# Patient Record
Sex: FEMALE | Race: WHITE | NOT HISPANIC OR LATINO | Employment: UNEMPLOYED | URBAN - METROPOLITAN AREA
[De-identification: names, ages, dates, MRNs, and addresses within clinical notes are randomized per-mention and may not be internally consistent; named-entity substitution may affect disease eponyms.]

---

## 2017-08-21 ENCOUNTER — PROCEDURE VISIT (OUTPATIENT)
Dept: OBSTETRICS AND GYNECOLOGY | Facility: CLINIC | Age: 22
End: 2017-08-21

## 2017-08-21 ENCOUNTER — INITIAL PRENATAL (OUTPATIENT)
Dept: OBSTETRICS AND GYNECOLOGY | Facility: CLINIC | Age: 22
End: 2017-08-21

## 2017-08-21 VITALS
BODY MASS INDEX: 20.55 KG/M2 | HEIGHT: 63 IN | DIASTOLIC BLOOD PRESSURE: 81 MMHG | SYSTOLIC BLOOD PRESSURE: 125 MMHG | WEIGHT: 116 LBS

## 2017-08-21 DIAGNOSIS — Z34.02 ENCOUNTER FOR SUPERVISION OF NORMAL FIRST PREGNANCY IN SECOND TRIMESTER: Primary | ICD-10-CM

## 2017-08-21 DIAGNOSIS — Z36.89 ENCOUNTER TO ESTABLISH GESTATIONAL AGE USING ULTRASOUND: Primary | ICD-10-CM

## 2017-08-21 PROCEDURE — 99203 OFFICE O/P NEW LOW 30 MIN: CPT | Performed by: OBSTETRICS & GYNECOLOGY

## 2017-08-21 PROCEDURE — 76817 TRANSVAGINAL US OBSTETRIC: CPT | Performed by: OBSTETRICS & GYNECOLOGY

## 2017-08-21 NOTE — PROGRESS NOTES
CC:  Initial OB visit  Patient presents for initial OB visit.  History reviewed with her.  Patient is overall in good health.  She denies any problems since she found out she was pregnant.  She is currently taking a prenatal vitamin.  Initial OB counseling was done with patient.  Discussed CF screening with patient.  A/P:  Supervision of early pregnancy  --Dating u/s ordered  --Labwork today  --F/U in 4 weeks  Counseling was given to patient for the following topics: instructions for management, risk factor reductions, patient and family education, risks and benefits of treatment options and importance of treatment compliance . Total time of the encounter was 30 minutes and 20 minutes was spend counseling.

## 2017-08-22 LAB
ABO GROUP BLD: (no result)
BASOPHILS # BLD AUTO: 0 X10E3/UL (ref 0–0.2)
BASOPHILS NFR BLD AUTO: 0 %
BLD GP AB SCN SERPL QL: NEGATIVE
EOSINOPHIL # BLD AUTO: 0 X10E3/UL (ref 0–0.4)
EOSINOPHIL NFR BLD AUTO: 0 %
ERYTHROCYTE [DISTWIDTH] IN BLOOD BY AUTOMATED COUNT: 12.6 % (ref 12.3–15.4)
HBV SURFACE AG SERPL QL IA: NEGATIVE
HCT VFR BLD AUTO: 41.8 % (ref 34–46.6)
HGB BLD-MCNC: 13.8 G/DL (ref 11.1–15.9)
HIV 1+2 AB+HIV1 P24 AG SERPL QL IA: NON REACTIVE
IMM GRANULOCYTES # BLD: 0 X10E3/UL (ref 0–0.1)
IMM GRANULOCYTES NFR BLD: 0 %
LYMPHOCYTES # BLD AUTO: 2 X10E3/UL (ref 0.7–3.1)
LYMPHOCYTES NFR BLD AUTO: 17 %
MCH RBC QN AUTO: 30.1 PG (ref 26.6–33)
MCHC RBC AUTO-ENTMCNC: 33 G/DL (ref 31.5–35.7)
MCV RBC AUTO: 91 FL (ref 79–97)
MONOCYTES # BLD AUTO: 0.6 X10E3/UL (ref 0.1–0.9)
MONOCYTES NFR BLD AUTO: 5 %
NEUTROPHILS # BLD AUTO: 8.7 X10E3/UL (ref 1.4–7)
NEUTROPHILS NFR BLD AUTO: 78 %
PLATELET # BLD AUTO: 203 X10E3/UL (ref 150–379)
RBC # BLD AUTO: 4.58 X10E6/UL (ref 3.77–5.28)
RH BLD: POSITIVE
RPR SER QL: NON REACTIVE
RUBV IGG SERPL IA-ACNC: 6.15 INDEX
WBC # BLD AUTO: 11.4 X10E3/UL (ref 3.4–10.8)

## 2017-08-23 LAB
BACTERIA UR CULT: NO GROWTH
BACTERIA UR CULT: NORMAL
C TRACH RRNA SPEC QL NAA+PROBE: NEGATIVE
N GONORRHOEA RRNA SPEC QL NAA+PROBE: NEGATIVE
T VAGINALIS RRNA SPEC QL NAA+PROBE: NEGATIVE

## 2017-08-25 LAB
CFTR MUT ANL BLD/T: NORMAL
CONV COMMENT: NORMAL

## 2017-09-18 ENCOUNTER — ROUTINE PRENATAL (OUTPATIENT)
Dept: OBSTETRICS AND GYNECOLOGY | Facility: CLINIC | Age: 22
End: 2017-09-18

## 2017-09-18 VITALS — BODY MASS INDEX: 20.37 KG/M2 | DIASTOLIC BLOOD PRESSURE: 78 MMHG | SYSTOLIC BLOOD PRESSURE: 106 MMHG | WEIGHT: 115 LBS

## 2017-09-18 DIAGNOSIS — Z34.01 ENCOUNTER FOR SUPERVISION OF NORMAL FIRST PREGNANCY IN FIRST TRIMESTER: Primary | ICD-10-CM

## 2017-09-18 PROCEDURE — 99213 OFFICE O/P EST LOW 20 MIN: CPT | Performed by: OBSTETRICS & GYNECOLOGY

## 2017-09-18 NOTE — PROGRESS NOTES
CC: Pregnancy  Patient complains of some nausea.  Patient states that she is tolerating most meals.  She declines any medication.  Discussed conservative management options for nausea and pregnancy.  Reviewed prenatal labs and ultrasound with her.  Discussed change in RANGEL.  Patient states that she has wanting gender testing.  Discussed cell free DNA testing with her and that it is not recommended in low risk pregnancies.  Discussed the rate of false positives and limitations of the test.  Also explained to her that her insurance will not cover it.  She still desires testing.  A/P:  Supervision of normal first pregnancy at 12 weeks  --F/U in 4 weeks  --Informaseq testing today  Counseling was given to patient for the following topics: diagnostic results, instructions for management, patient and family education and risks and benefits of treatment options . Total time of the encounter was 15 minutes and 10 minutes was spend counseling.

## 2017-09-22 LAB
# FETUSES US: 1
CFDNA.FET/CFDNA.TOTAL SFR FETUS: 17.2 %
CHR X + Y ANEUP PLAS.CFDNA: NORMAL
CITATION REF LAB TEST: NORMAL
CYTOGENETICS STUDY: NORMAL
FET 13+18+21+X+Y ANEUP PLAS.CFDNA: NORMAL
FET CHR 13 TS PLAS.CFDNA QL: NORMAL
FET CHR 13 TS PLAS.CFDNA QL: NORMAL
FET CHR 18 TS PLAS.CFDNA QL: NORMAL
FET CHR 18 TS PLAS.CFDNA QL: NORMAL
FET CHR 21 TS PLAS.CFDNA QL: NORMAL
FET CHR 21 TS PLAS.CFDNA QL: NORMAL
FET CHROM X + Y ANEUP CFDNA IMP: NORMAL
GA: 12 WEEKS
GENETIC ALGORITHM SENSITIVITY: NORMAL %
LAB DIRECTOR NAME PROVIDER: NORMAL
Lab: NORMAL
REASON FOR REFERRAL (NARRATIVE): NORMAL
REF LAB TEST METHOD: NORMAL
SERVICE CMNT 02-IMP: NORMAL
SERVICE CMNT-IMP: NORMAL

## 2017-09-25 ENCOUNTER — TELEPHONE (OUTPATIENT)
Dept: OBSTETRICS AND GYNECOLOGY | Facility: CLINIC | Age: 22
End: 2017-09-25

## 2017-09-25 NOTE — TELEPHONE ENCOUNTER
----- Message from Franchesca Tanner MD sent at 9/25/2017  8:52 AM EDT -----  Please let patient know her genetic testing was normal and if she wants to know gender, it showed a male fetus.

## 2017-10-23 ENCOUNTER — ROUTINE PRENATAL (OUTPATIENT)
Dept: OBSTETRICS AND GYNECOLOGY | Facility: CLINIC | Age: 22
End: 2017-10-23

## 2017-10-23 VITALS — WEIGHT: 117 LBS | DIASTOLIC BLOOD PRESSURE: 82 MMHG | SYSTOLIC BLOOD PRESSURE: 120 MMHG | BODY MASS INDEX: 20.73 KG/M2

## 2017-10-23 DIAGNOSIS — Z34.02 ENCOUNTER FOR SUPERVISION OF NORMAL FIRST PREGNANCY IN SECOND TRIMESTER: Primary | ICD-10-CM

## 2017-10-23 PROCEDURE — 99213 OFFICE O/P EST LOW 20 MIN: CPT | Performed by: OBSTETRICS & GYNECOLOGY

## 2017-10-23 NOTE — PROGRESS NOTES
CC:  Pregnancy  Pt without c/o's.  Declines flu vaccine.  Patient has no complaints today.  She states her nausea is improving.  Discussed MSAFP testing with her and she desires.  Also discussed anatomy ultrasound in 3 weeks.  A/P:  Supervision of normal first pregnancy at 17 weeks  --msafp testing today  --anatomy u/s in 3 weeks  --ob visit in 4 weeks  Counseling was given to patient for the following topics: diagnostic results, instructions for management and patient and family education . Total time of the encounter was 15 minutes and 10 minutes was spend counseling.

## 2017-10-25 ENCOUNTER — TELEPHONE (OUTPATIENT)
Dept: OBSTETRICS AND GYNECOLOGY | Facility: CLINIC | Age: 22
End: 2017-10-25

## 2017-10-25 LAB
2ND TRIMESTER 4 SCREEN SERPL-IMP: NORMAL
2ND TRIMESTER 4 SCREEN SERPL-IMP: NORMAL
AFP ADJ MOM SERPL: 1.63
AFP SERPL-MCNC: 72.8 NG/ML
AGE AT DELIVERY: 21.2 YEARS
FET TS 18 RISK FROM MAT AGE: NORMAL
FET TS 21 RISK FROM MAT AGE: 1144
GA METHOD: NORMAL
GA: 17 WEEKS
HCG ADJ MOM SERPL: 1.94
HCG SERPL-ACNC: NORMAL MIU/ML
IDDM PATIENT QL: NO
INHIBIN A ADJ MOM SERPL: 1.13
INHIBIN A SERPL-MCNC: 231.87 PG/ML
LABORATORY COMMENT REPORT: NORMAL
MULTIPLE PREGNANCY: NO
NEURAL TUBE DEFECT RISK FETUS: 1944 %
RESULT: NORMAL
TS 18 RISK FETUS: NORMAL
TS 21 RISK FETUS: NORMAL
U ESTRIOL ADJ MOM SERPL: 0.81
U ESTRIOL SERPL-MCNC: 0.94 NG/ML

## 2017-10-25 NOTE — TELEPHONE ENCOUNTER
----- Message from Colette Alvarenga sent at 10/25/2017  9:48 AM EDT -----  Patient aware of her test results.

## 2017-11-13 ENCOUNTER — PROCEDURE VISIT (OUTPATIENT)
Dept: OBSTETRICS AND GYNECOLOGY | Facility: CLINIC | Age: 22
End: 2017-11-13

## 2017-11-13 DIAGNOSIS — Z36.3 ANTENATAL SCREENING FOR MALFORMATION USING ULTRASONICS: Primary | ICD-10-CM

## 2017-11-13 PROCEDURE — 76805 OB US >/= 14 WKS SNGL FETUS: CPT | Performed by: OBSTETRICS & GYNECOLOGY

## 2017-11-20 ENCOUNTER — ROUTINE PRENATAL (OUTPATIENT)
Dept: OBSTETRICS AND GYNECOLOGY | Facility: CLINIC | Age: 22
End: 2017-11-20

## 2017-11-20 VITALS — DIASTOLIC BLOOD PRESSURE: 67 MMHG | BODY MASS INDEX: 21.68 KG/M2 | SYSTOLIC BLOOD PRESSURE: 116 MMHG | WEIGHT: 122.4 LBS

## 2017-11-20 DIAGNOSIS — Z34.02 ENCOUNTER FOR SUPERVISION OF NORMAL FIRST PREGNANCY IN SECOND TRIMESTER: Primary | ICD-10-CM

## 2017-11-20 PROCEDURE — 99213 OFFICE O/P EST LOW 20 MIN: CPT | Performed by: OBSTETRICS & GYNECOLOGY

## 2017-11-20 RX ORDER — ACETAMINOPHEN 325 MG/1
650 TABLET ORAL EVERY 6 HOURS PRN
COMMUNITY
End: 2018-02-12

## 2017-11-20 NOTE — PROGRESS NOTES
CC:  Pregnancy  Patient complains of occasional pain in her stomach that radiates into her chest.  States it occurs after meals.  Pain is likely related to gas or heartburn.  Discussed OTC medications she may take.  Anatomy u/s reviewed with her.  Discussed 1 hr gtt at her next visit and instructions given for test.  A/P:  Supervision of normal first pregnancy at 21 weeks  --F/U in 4 weeks  --1 hr gtt at next visit  Counseling was given to patient for the following topics: diagnostic results, instructions for management and patient and family education . Total time of the encounter was 15 minutes and 10 minutes was spend counseling.

## 2017-12-19 ENCOUNTER — ROUTINE PRENATAL (OUTPATIENT)
Dept: OBSTETRICS AND GYNECOLOGY | Facility: CLINIC | Age: 22
End: 2017-12-19

## 2017-12-19 VITALS — WEIGHT: 130.8 LBS | BODY MASS INDEX: 23.17 KG/M2 | SYSTOLIC BLOOD PRESSURE: 102 MMHG | DIASTOLIC BLOOD PRESSURE: 67 MMHG

## 2017-12-19 DIAGNOSIS — Z34.02 ENCOUNTER FOR SUPERVISION OF NORMAL FIRST PREGNANCY IN SECOND TRIMESTER: ICD-10-CM

## 2017-12-19 DIAGNOSIS — O99.891 BACK PAIN AFFECTING PREGNANCY IN SECOND TRIMESTER: ICD-10-CM

## 2017-12-19 DIAGNOSIS — Z3A.25 25 WEEKS GESTATION OF PREGNANCY: Primary | ICD-10-CM

## 2017-12-19 DIAGNOSIS — M54.9 BACK PAIN AFFECTING PREGNANCY IN SECOND TRIMESTER: ICD-10-CM

## 2017-12-19 PROCEDURE — 99213 OFFICE O/P EST LOW 20 MIN: CPT | Performed by: OBSTETRICS & GYNECOLOGY

## 2017-12-19 NOTE — PROGRESS NOTES
CC: Pregnancy follow up  Pt states that she has been having pain in lower abdomen for the past 2 days. Not sure if Preston dominguez.  Patient describes pain as mild.  No bleeding or spotting.  Excellent FM.  Patient has back discomfort, but has history of scoliosis.  Vitals reviewed by me.  Gen - alert and pleasant.  Abdomen - gravid, nontender, no guarding or rebound.  Glucola in progress.  A/P:  IUP at 25 weeks  - Flu vaccine discussed.  Discussed rationale and benefits.  Patient to consider.  - Back pain.  Discussed symptomatic treatments.  Scoliosis alone is not a contraindication to regional anesthesia, need for .  - maternal well being discussed.    I spent 15 minutes with patient and 10 minutes in counseling of above issues.

## 2017-12-20 LAB — GLUCOSE 1H P 50 G GLC PO SERPL-MCNC: 54 MG/DL (ref 65–139)

## 2018-01-15 ENCOUNTER — ROUTINE PRENATAL (OUTPATIENT)
Dept: OBSTETRICS AND GYNECOLOGY | Facility: CLINIC | Age: 23
End: 2018-01-15

## 2018-01-15 VITALS — SYSTOLIC BLOOD PRESSURE: 106 MMHG | BODY MASS INDEX: 24.2 KG/M2 | DIASTOLIC BLOOD PRESSURE: 64 MMHG | WEIGHT: 136.6 LBS

## 2018-01-15 DIAGNOSIS — Z34.03 ENCOUNTER FOR SUPERVISION OF NORMAL FIRST PREGNANCY IN THIRD TRIMESTER: Primary | ICD-10-CM

## 2018-01-15 PROCEDURE — 99213 OFFICE O/P EST LOW 20 MIN: CPT | Performed by: OBSTETRICS & GYNECOLOGY

## 2018-01-15 PROCEDURE — 90656 IIV3 VACC NO PRSV 0.5 ML IM: CPT | Performed by: OBSTETRICS & GYNECOLOGY

## 2018-01-15 PROCEDURE — 90715 TDAP VACCINE 7 YRS/> IM: CPT | Performed by: OBSTETRICS & GYNECOLOGY

## 2018-01-15 PROCEDURE — 90472 IMMUNIZATION ADMIN EACH ADD: CPT | Performed by: OBSTETRICS & GYNECOLOGY

## 2018-01-15 PROCEDURE — 90471 IMMUNIZATION ADMIN: CPT | Performed by: OBSTETRICS & GYNECOLOGY

## 2018-01-15 NOTE — PROGRESS NOTES
CC:  Pregnancy  Patient complains of some pelvic pressure with walking.  She also reports some soreness at the very top of her abdomen.  She denies any chest pain or shortness of breath.  On exam, her lungs are clear to auscultation bilaterally and heart is regular rate and rhythm.  Pain seems most consistent with musculoskeletal pain from the baby growing and the skin stretching.  Patient reports good fetal movement.  Discussed recommendations for Tdap and flu vaccine and she desires both vaccinations.  A/P:  Supervision of normal pregnancy at 29 weeks  --Will check growth u/s at next visit  --tdap and flu shot today  --f/u in 2 weeks  Counseling was given to patient for the following topics: diagnostic results, instructions for management and patient and family education . Total time of the encounter was 15 minutes and 10 minutes was spend counseling.

## 2018-01-29 ENCOUNTER — ROUTINE PRENATAL (OUTPATIENT)
Dept: OBSTETRICS AND GYNECOLOGY | Facility: CLINIC | Age: 23
End: 2018-01-29

## 2018-01-29 ENCOUNTER — PROCEDURE VISIT (OUTPATIENT)
Dept: OBSTETRICS AND GYNECOLOGY | Facility: CLINIC | Age: 23
End: 2018-01-29

## 2018-01-29 VITALS — DIASTOLIC BLOOD PRESSURE: 77 MMHG | BODY MASS INDEX: 24.69 KG/M2 | WEIGHT: 139.4 LBS | SYSTOLIC BLOOD PRESSURE: 115 MMHG

## 2018-01-29 DIAGNOSIS — Z3A.31 31 WEEKS GESTATION OF PREGNANCY: ICD-10-CM

## 2018-01-29 DIAGNOSIS — O26.843 UTERINE SIZE DATE DISCREPANCY PREGNANCY, THIRD TRIMESTER: Primary | ICD-10-CM

## 2018-01-29 PROCEDURE — 99213 OFFICE O/P EST LOW 20 MIN: CPT | Performed by: OBSTETRICS & GYNECOLOGY

## 2018-01-29 PROCEDURE — 76816 OB US FOLLOW-UP PER FETUS: CPT | Performed by: OBSTETRICS & GYNECOLOGY

## 2018-01-29 NOTE — PROGRESS NOTES
CC:  Pregnancy  Pt feels well. No issues or concerns.  Reviewed growth ultrasound with patient and growth is reassuring.  Fetus is noted to be in the adolph breech presentation.  Discussed with patient that the baby can still flip to vertex presentation and will reevaluate around 35-36 weeks.  Discussed with her and her significant other management options of a breech fetus near-term including external cephalic version.  Explained to them that if the baby is breech at time of labor, she will need a primary .  They verbalize understanding.  She reports good fetal movement.  She denies any contractions or leaking of fluid.  A/P:  Supervision of normal pregnancy at 31 weeks with breech fetus  --F/U in 2 weeks  Counseling was given to patient for the following topics: diagnostic results, instructions for management, prognosis and patient and family education . Total time of the encounter was 15 minutes and 10 minutes was spend counseling.

## 2018-02-12 ENCOUNTER — ROUTINE PRENATAL (OUTPATIENT)
Dept: OBSTETRICS AND GYNECOLOGY | Facility: CLINIC | Age: 23
End: 2018-02-12

## 2018-02-12 VITALS — SYSTOLIC BLOOD PRESSURE: 103 MMHG | DIASTOLIC BLOOD PRESSURE: 68 MMHG | BODY MASS INDEX: 25.26 KG/M2 | WEIGHT: 142.6 LBS

## 2018-02-12 DIAGNOSIS — Z3A.33 33 WEEKS GESTATION OF PREGNANCY: ICD-10-CM

## 2018-02-12 PROCEDURE — 99213 OFFICE O/P EST LOW 20 MIN: CPT | Performed by: OBSTETRICS & GYNECOLOGY

## 2018-02-12 NOTE — PROGRESS NOTES
CC:  Pregnancy  Pt feels well. No issues or concerns.  Patient reports good fetal movement.  Baby is still in breech presentation on Leopold's.  Discussed with patient and ECV at 37 weeks the baby remains breech.  Will recheck an ultrasound for position 36 weeks to confirm.  Discussed a primary  between 39 and 40 weeks if the baby remains breech until her due date.  Discussed fetal kick counts.  A/P:  Supervision of normal pregnancy at 33 weeks with breech presentation of fetus  --F/U in 2 weeks  Counseling was given to patient for the following topics: instructions for management and patient and family education . Total time of the encounter was 15 minutes and 10 minutes was spend counseling.

## 2018-02-26 ENCOUNTER — ROUTINE PRENATAL (OUTPATIENT)
Dept: OBSTETRICS AND GYNECOLOGY | Facility: CLINIC | Age: 23
End: 2018-02-26

## 2018-02-26 VITALS — SYSTOLIC BLOOD PRESSURE: 102 MMHG | BODY MASS INDEX: 25.54 KG/M2 | DIASTOLIC BLOOD PRESSURE: 65 MMHG | WEIGHT: 144.2 LBS

## 2018-02-26 DIAGNOSIS — Z3A.35 35 WEEKS GESTATION OF PREGNANCY: ICD-10-CM

## 2018-02-26 PROCEDURE — 99213 OFFICE O/P EST LOW 20 MIN: CPT | Performed by: OBSTETRICS & GYNECOLOGY

## 2018-02-26 NOTE — PROGRESS NOTES
CC:  Pregnancy  Pt feels well. No issues or concerns.  Patient reports good fetal movement.  Fetus remains breech on Leopold's.  Will check an ultrasound with her visit next week to confirm position.  Discussed external cephalic version versus primary  with patient and discussed risks involved with external cephalic version.  Patient and her significant other have discussed this previous to today's visit and patient states that she does not want an external cephalic version.  She would prefer to have a primary  section if fetus remains breech.  GBS swab was collected today.  A/P:  Supervision of pregnancy at 35 weeks with fetus in breech presentation  --GBS done today  --F/U next week with u/s for position  Counseling was given to patient for the following topics: instructions for management, patient and family education and risks and benefits of treatment options . Total time of the encounter was 15 minutes and 10 minutes was spend counseling.

## 2018-03-02 LAB — B-HEM STREP SPEC QL CULT: NEGATIVE

## 2018-03-06 ENCOUNTER — PROCEDURE VISIT (OUTPATIENT)
Dept: OBSTETRICS AND GYNECOLOGY | Facility: CLINIC | Age: 23
End: 2018-03-06

## 2018-03-06 ENCOUNTER — ROUTINE PRENATAL (OUTPATIENT)
Dept: OBSTETRICS AND GYNECOLOGY | Facility: CLINIC | Age: 23
End: 2018-03-06

## 2018-03-06 VITALS — SYSTOLIC BLOOD PRESSURE: 106 MMHG | BODY MASS INDEX: 26.29 KG/M2 | DIASTOLIC BLOOD PRESSURE: 70 MMHG | WEIGHT: 148.4 LBS

## 2018-03-06 DIAGNOSIS — Z3A.36 36 WEEKS GESTATION OF PREGNANCY: ICD-10-CM

## 2018-03-06 PROCEDURE — 99213 OFFICE O/P EST LOW 20 MIN: CPT | Performed by: OBSTETRICS & GYNECOLOGY

## 2018-03-06 PROCEDURE — 76815 OB US LIMITED FETUS(S): CPT | Performed by: OBSTETRICS & GYNECOLOGY

## 2018-03-06 NOTE — PROGRESS NOTES
CC:  Pregnancy  Patient had an ultrasound to confirm position today and the infant remains in the adolph breech presentation.  Discussed option of external cephalic version with patient and her significant other but they declined.  Patient states that they prefer to have a primary  section.  Will schedule this between 39 and 40 weeks.   section was discussed with them.  Patient complains of some sharp lower abdominal pains and intermittent cramping.  Normal musculoskeletal complaints were discussed with her.  Labor precautions were reviewed and discussed fetal kick counts.  A/P:  Supervision of normal pregnancy at 36 weeks with breech presentation of fetus  --Will schedule primary c section  --F/U weekly  Counseling was given to patient for the following topics: diagnostic results, instructions for management, patient and family education and risks and benefits of treatment options . Total time of the encounter was 15 minutes and 10 minutes was spend counseling.

## 2018-03-12 ENCOUNTER — ROUTINE PRENATAL (OUTPATIENT)
Dept: OBSTETRICS AND GYNECOLOGY | Facility: CLINIC | Age: 23
End: 2018-03-12

## 2018-03-12 VITALS — BODY MASS INDEX: 26.54 KG/M2 | SYSTOLIC BLOOD PRESSURE: 116 MMHG | DIASTOLIC BLOOD PRESSURE: 71 MMHG | WEIGHT: 149.8 LBS

## 2018-03-12 DIAGNOSIS — Z3A.37 37 WEEKS GESTATION OF PREGNANCY: ICD-10-CM

## 2018-03-12 PROCEDURE — 99213 OFFICE O/P EST LOW 20 MIN: CPT | Performed by: OBSTETRICS & GYNECOLOGY

## 2018-03-12 NOTE — PROGRESS NOTES
CC:  Pregnancy  Pt feels well. No issues or concerns.  Patient's  has been scheduled.  Date and time reviewed with her and discussed preoperative instructions.  Patient is attending a  class at the hospital.  All questions were answered today regarding .  She reports good fetal movement.  Labor precautions reviewed and patient instructed to go to L&D with onset of labor or rupture of membranes.  She denies any contractions or leaking.  A/P:  Supervision of normal pregnancy at 37 weeks with breech fetus  --F/U weekly  Counseling was given to patient for the following topics: instructions for management and patient and family education . Total time of the encounter was 15 minutes and 10 minutes was spend counseling.

## 2018-03-14 ENCOUNTER — HOSPITAL ENCOUNTER (OUTPATIENT)
Facility: HOSPITAL | Age: 23
Setting detail: OBSERVATION
Discharge: HOME | End: 2018-03-14
Attending: OBSTETRICS & GYNECOLOGY | Admitting: OBSTETRICS & GYNECOLOGY

## 2018-03-14 VITALS
RESPIRATION RATE: 16 BRPM | BODY MASS INDEX: 26.68 KG/M2 | WEIGHT: 150.6 LBS | HEIGHT: 63 IN | DIASTOLIC BLOOD PRESSURE: 73 MMHG | HEART RATE: 84 BPM | TEMPERATURE: 98.2 F | SYSTOLIC BLOOD PRESSURE: 132 MMHG

## 2018-03-14 PROBLEM — Z34.90 PREGNANCY: Status: ACTIVE | Noted: 2018-03-14

## 2018-03-14 LAB — A1 MICROGLOB PLACENTAL VAG QL: NEGATIVE

## 2018-03-14 PROCEDURE — G0378 HOSPITAL OBSERVATION PER HR: HCPCS

## 2018-03-14 PROCEDURE — 84112 EVAL AMNIOTIC FLUID PROTEIN: CPT | Performed by: OBSTETRICS & GYNECOLOGY

## 2018-03-14 PROCEDURE — 59025 FETAL NON-STRESS TEST: CPT

## 2018-03-14 PROCEDURE — 59025 FETAL NON-STRESS TEST: CPT | Performed by: OBSTETRICS & GYNECOLOGY

## 2018-03-14 NOTE — NON STRESS TEST
Katt Arias, a  at 37w2d with an RANGEL of 2018, by Ultrasound, was seen at [facility] for a nonstress test.    Chief Complaint   Patient presents with   • Rupture of Membranes     r/o srom at 0900 37wk breech       Interpretation A  Nonstress Test Interpretation A: Reactive (18 1400 : Shabana Baron RN)   NST TIME 4218-3930

## 2018-03-19 ENCOUNTER — ROUTINE PRENATAL (OUTPATIENT)
Dept: OBSTETRICS AND GYNECOLOGY | Facility: CLINIC | Age: 23
End: 2018-03-19

## 2018-03-19 VITALS — SYSTOLIC BLOOD PRESSURE: 109 MMHG | WEIGHT: 151.6 LBS | BODY MASS INDEX: 26.85 KG/M2 | DIASTOLIC BLOOD PRESSURE: 72 MMHG

## 2018-03-19 DIAGNOSIS — Z3A.38 38 WEEKS GESTATION OF PREGNANCY: ICD-10-CM

## 2018-03-19 PROCEDURE — 99213 OFFICE O/P EST LOW 20 MIN: CPT | Performed by: OBSTETRICS & GYNECOLOGY

## 2018-03-19 NOTE — PROGRESS NOTES
CC:  Pregnancy  Pt feels well. No problems or concerns.  Patient feels that baby's movements are slowing down.  Discussed fetal kick counts with her and she feels that baby is moving 10 times every 2 hours.  Patient was instructed to report to labor and delivery if fetal movements are decreased.  Patient has a scheduled  next week for breech presentation.  Labor precautions reviewed with her and she was instructed to go immediately to labor and delivery with any onset of labor or rupture of membranes.  A/P:  Supervision of normal pregnancy at 38 weeks with breech presentation of fetus  --F/U next Monday  --C/S scheduled next Wednesday  Counseling was given to patient for the following topics: instructions for management and patient and family education . Total time of the encounter was 15 minutes and 10 minutes was spend counseling.

## 2018-03-26 ENCOUNTER — ROUTINE PRENATAL (OUTPATIENT)
Dept: OBSTETRICS AND GYNECOLOGY | Facility: CLINIC | Age: 23
End: 2018-03-26

## 2018-03-26 VITALS — DIASTOLIC BLOOD PRESSURE: 69 MMHG | BODY MASS INDEX: 28.02 KG/M2 | WEIGHT: 158.2 LBS | SYSTOLIC BLOOD PRESSURE: 107 MMHG

## 2018-03-26 DIAGNOSIS — Z3A.39 39 WEEKS GESTATION OF PREGNANCY: ICD-10-CM

## 2018-03-26 PROCEDURE — 99213 OFFICE O/P EST LOW 20 MIN: CPT | Performed by: OBSTETRICS & GYNECOLOGY

## 2018-03-26 NOTE — PROGRESS NOTES
CC:  Pregnancy  Patient complains of a tender area on her abdomen and the right upper quadrant.  She states she has this tenderness with movement, cough, or sneeze.  The abdominal wall is slightly tender to touch and seems most consistent with musculoskeletal pain.  Advised heating pad or Tylenol.  Patient has a primary  scheduled in 2 days for breech presentation.  She reports good fetal movement and denies any contractions.  Instructions for c section reviewed.  A/P:  Supervision of pregnancy at 39 weeks with breech presentation  --C/S scheduled in 2 days  Counseling was given to patient for the following topics: instructions for management and patient and family education . Total time of the encounter was 15 minutes and 10 minutes was spend counseling.

## 2018-03-28 ENCOUNTER — ANESTHESIA (OUTPATIENT)
Dept: LABOR AND DELIVERY | Facility: HOSPITAL | Age: 23
End: 2018-03-28

## 2018-03-28 ENCOUNTER — HOSPITAL ENCOUNTER (INPATIENT)
Facility: HOSPITAL | Age: 23
LOS: 3 days | Discharge: HOME | End: 2018-03-31
Attending: OBSTETRICS & GYNECOLOGY | Admitting: OBSTETRICS & GYNECOLOGY

## 2018-03-28 ENCOUNTER — ANESTHESIA EVENT (OUTPATIENT)
Dept: LABOR AND DELIVERY | Facility: HOSPITAL | Age: 23
End: 2018-03-28

## 2018-03-28 PROBLEM — Z98.891 S/P CESAREAN SECTION: Status: ACTIVE | Noted: 2018-03-28

## 2018-03-28 LAB
ABO GROUP BLD: NORMAL
BASOPHILS # BLD AUTO: 0.02 10*3/MM3 (ref 0–0.2)
BASOPHILS NFR BLD AUTO: 0.2 % (ref 0–1.5)
BLD GP AB SCN SERPL QL: NEGATIVE
DEPRECATED RDW RBC AUTO: 47.6 FL (ref 37–54)
EOSINOPHIL # BLD AUTO: 0.08 10*3/MM3 (ref 0–0.7)
EOSINOPHIL NFR BLD AUTO: 0.6 % (ref 0.3–6.2)
ERYTHROCYTE [DISTWIDTH] IN BLOOD BY AUTOMATED COUNT: 13.7 % (ref 11.7–13)
HCT VFR BLD AUTO: 44.8 % (ref 35.6–45.5)
HGB BLD-MCNC: 14.5 G/DL (ref 11.9–15.5)
IMM GRANULOCYTES # BLD: 0.08 10*3/MM3 (ref 0–0.03)
IMM GRANULOCYTES NFR BLD: 0.6 % (ref 0–0.5)
LYMPHOCYTES # BLD AUTO: 2.32 10*3/MM3 (ref 0.9–4.8)
LYMPHOCYTES NFR BLD AUTO: 18.1 % (ref 19.6–45.3)
MCH RBC QN AUTO: 31 PG (ref 26.9–32)
MCHC RBC AUTO-ENTMCNC: 32.4 G/DL (ref 32.4–36.3)
MCV RBC AUTO: 95.7 FL (ref 80.5–98.2)
MONOCYTES # BLD AUTO: 0.87 10*3/MM3 (ref 0.2–1.2)
MONOCYTES NFR BLD AUTO: 6.8 % (ref 5–12)
NEUTROPHILS # BLD AUTO: 9.47 10*3/MM3 (ref 1.9–8.1)
NEUTROPHILS NFR BLD AUTO: 73.7 % (ref 42.7–76)
PLATELET # BLD AUTO: 115 10*3/MM3 (ref 140–500)
PMV BLD AUTO: 13.7 FL (ref 6–12)
RBC # BLD AUTO: 4.68 10*6/MM3 (ref 3.9–5.2)
RH BLD: POSITIVE
WBC NRBC COR # BLD: 12.84 10*3/MM3 (ref 4.5–10.7)

## 2018-03-28 PROCEDURE — 86900 BLOOD TYPING SEROLOGIC ABO: CPT | Performed by: OBSTETRICS & GYNECOLOGY

## 2018-03-28 PROCEDURE — 85025 COMPLETE CBC W/AUTO DIFF WBC: CPT | Performed by: OBSTETRICS & GYNECOLOGY

## 2018-03-28 PROCEDURE — 25010000002 MORPHINE PER 10 MG: Performed by: NURSE ANESTHETIST, CERTIFIED REGISTERED

## 2018-03-28 PROCEDURE — 86850 RBC ANTIBODY SCREEN: CPT | Performed by: OBSTETRICS & GYNECOLOGY

## 2018-03-28 PROCEDURE — 25010000002 HYDROMORPHONE PER 4 MG: Performed by: ANESTHESIOLOGY

## 2018-03-28 PROCEDURE — 25010000002 PHENYLEPHRINE PER 1 ML: Performed by: NURSE ANESTHETIST, CERTIFIED REGISTERED

## 2018-03-28 PROCEDURE — 25010000002 HYDROMORPHONE PER 4 MG: Performed by: NURSE ANESTHETIST, CERTIFIED REGISTERED

## 2018-03-28 PROCEDURE — 86901 BLOOD TYPING SEROLOGIC RH(D): CPT | Performed by: OBSTETRICS & GYNECOLOGY

## 2018-03-28 PROCEDURE — 25010000002 KETOROLAC TROMETHAMINE PER 15 MG: Performed by: NURSE ANESTHETIST, CERTIFIED REGISTERED

## 2018-03-28 PROCEDURE — 25010000003 CEFAZOLIN IN DEXTROSE 2-4 GM/100ML-% SOLUTION: Performed by: OBSTETRICS & GYNECOLOGY

## 2018-03-28 PROCEDURE — 59514 CESAREAN DELIVERY ONLY: CPT | Performed by: OBSTETRICS & GYNECOLOGY

## 2018-03-28 PROCEDURE — C1755 CATHETER, INTRASPINAL: HCPCS | Performed by: ANESTHESIOLOGY

## 2018-03-28 PROCEDURE — 25010000002 ONDANSETRON PER 1 MG: Performed by: ANESTHESIOLOGY

## 2018-03-28 PROCEDURE — 59515 CESAREAN DELIVERY: CPT | Performed by: OBSTETRICS & GYNECOLOGY

## 2018-03-28 RX ORDER — HYDROXYZINE 50 MG/1
50 TABLET, FILM COATED ORAL EVERY 6 HOURS PRN
Status: DISCONTINUED | OUTPATIENT
Start: 2018-03-28 | End: 2018-03-31 | Stop reason: HOSPADM

## 2018-03-28 RX ORDER — ONDANSETRON 2 MG/ML
4 INJECTION INTRAMUSCULAR; INTRAVENOUS ONCE AS NEEDED
Status: COMPLETED | OUTPATIENT
Start: 2018-03-28 | End: 2018-03-28

## 2018-03-28 RX ORDER — IBUPROFEN 800 MG/1
800 TABLET ORAL EVERY 8 HOURS PRN
Status: DISCONTINUED | OUTPATIENT
Start: 2018-03-28 | End: 2018-03-31 | Stop reason: HOSPADM

## 2018-03-28 RX ORDER — HYDROMORPHONE HCL 110MG/55ML
0.5 PATIENT CONTROLLED ANALGESIA SYRINGE INTRAVENOUS
Status: DISCONTINUED | OUTPATIENT
Start: 2018-03-28 | End: 2018-03-31 | Stop reason: HOSPADM

## 2018-03-28 RX ORDER — CALCIUM CARBONATE 200(500)MG
1 TABLET,CHEWABLE ORAL EVERY 6 HOURS PRN
Status: DISCONTINUED | OUTPATIENT
Start: 2018-03-28 | End: 2018-03-31 | Stop reason: HOSPADM

## 2018-03-28 RX ORDER — KETOROLAC TROMETHAMINE 30 MG/ML
INJECTION, SOLUTION INTRAMUSCULAR; INTRAVENOUS AS NEEDED
Status: DISCONTINUED | OUTPATIENT
Start: 2018-03-28 | End: 2018-03-28 | Stop reason: SURG

## 2018-03-28 RX ORDER — OXYTOCIN-SODIUM CHLORIDE 0.9% IV SOLN 30 UNIT/500ML 30-0.9/5 UT/ML-%
999 SOLUTION INTRAVENOUS ONCE
Status: COMPLETED | OUTPATIENT
Start: 2018-03-28 | End: 2018-03-28

## 2018-03-28 RX ORDER — MISOPROSTOL 200 UG/1
800 TABLET ORAL AS NEEDED
Status: DISCONTINUED | OUTPATIENT
Start: 2018-03-28 | End: 2018-03-31 | Stop reason: HOSPADM

## 2018-03-28 RX ORDER — MORPHINE SULFATE 1 MG/ML
INJECTION, SOLUTION EPIDURAL; INTRATHECAL; INTRAVENOUS AS NEEDED
Status: DISCONTINUED | OUTPATIENT
Start: 2018-03-28 | End: 2018-03-28 | Stop reason: SURG

## 2018-03-28 RX ORDER — CARBOPROST TROMETHAMINE 250 UG/ML
250 INJECTION, SOLUTION INTRAMUSCULAR AS NEEDED
Status: DISCONTINUED | OUTPATIENT
Start: 2018-03-28 | End: 2018-03-31 | Stop reason: HOSPADM

## 2018-03-28 RX ORDER — SODIUM CHLORIDE, SODIUM LACTATE, POTASSIUM CHLORIDE, CALCIUM CHLORIDE 600; 310; 30; 20 MG/100ML; MG/100ML; MG/100ML; MG/100ML
1000 INJECTION, SOLUTION INTRAVENOUS ONCE
Status: COMPLETED | OUTPATIENT
Start: 2018-03-28 | End: 2018-03-28

## 2018-03-28 RX ORDER — ACETAMINOPHEN 500 MG
1000 TABLET ORAL ONCE
Status: COMPLETED | OUTPATIENT
Start: 2018-03-28 | End: 2018-03-28

## 2018-03-28 RX ORDER — DIPHENHYDRAMINE HCL 25 MG
25 CAPSULE ORAL EVERY 4 HOURS PRN
Status: CANCELLED | OUTPATIENT
Start: 2018-03-28

## 2018-03-28 RX ORDER — HYDROMORPHONE HCL 110MG/55ML
0.5 PATIENT CONTROLLED ANALGESIA SYRINGE INTRAVENOUS
Status: DISCONTINUED | OUTPATIENT
Start: 2018-03-28 | End: 2018-03-28

## 2018-03-28 RX ORDER — OXYCODONE HYDROCHLORIDE AND ACETAMINOPHEN 5; 325 MG/1; MG/1
2 TABLET ORAL EVERY 4 HOURS PRN
Status: DISCONTINUED | OUTPATIENT
Start: 2018-03-28 | End: 2018-03-31 | Stop reason: HOSPADM

## 2018-03-28 RX ORDER — CEFAZOLIN SODIUM 2 G/100ML
2 INJECTION, SOLUTION INTRAVENOUS ONCE
Status: COMPLETED | OUTPATIENT
Start: 2018-03-28 | End: 2018-03-28

## 2018-03-28 RX ORDER — DOCUSATE SODIUM 100 MG/1
100 CAPSULE, LIQUID FILLED ORAL 2 TIMES DAILY PRN
Status: DISCONTINUED | OUTPATIENT
Start: 2018-03-28 | End: 2018-03-31 | Stop reason: HOSPADM

## 2018-03-28 RX ORDER — DIPHENHYDRAMINE HYDROCHLORIDE 50 MG/ML
25 INJECTION INTRAMUSCULAR; INTRAVENOUS EVERY 4 HOURS PRN
Status: CANCELLED | OUTPATIENT
Start: 2018-03-28

## 2018-03-28 RX ORDER — SODIUM CHLORIDE, SODIUM LACTATE, POTASSIUM CHLORIDE, CALCIUM CHLORIDE 600; 310; 30; 20 MG/100ML; MG/100ML; MG/100ML; MG/100ML
125 INJECTION, SOLUTION INTRAVENOUS CONTINUOUS
Status: DISCONTINUED | OUTPATIENT
Start: 2018-03-28 | End: 2018-03-31 | Stop reason: HOSPADM

## 2018-03-28 RX ORDER — FAMOTIDINE 10 MG/ML
20 INJECTION, SOLUTION INTRAVENOUS ONCE AS NEEDED
Status: COMPLETED | OUTPATIENT
Start: 2018-03-28 | End: 2018-03-28

## 2018-03-28 RX ORDER — PRENATAL VIT NO.126/IRON/FOLIC 28MG-0.8MG
1 TABLET ORAL DAILY
Status: DISCONTINUED | OUTPATIENT
Start: 2018-03-28 | End: 2018-03-31 | Stop reason: HOSPADM

## 2018-03-28 RX ORDER — PROMETHAZINE HYDROCHLORIDE 12.5 MG/1
12.5 TABLET ORAL EVERY 4 HOURS PRN
Status: DISCONTINUED | OUTPATIENT
Start: 2018-03-28 | End: 2018-03-31 | Stop reason: HOSPADM

## 2018-03-28 RX ORDER — ZOLPIDEM TARTRATE 5 MG/1
5 TABLET ORAL NIGHTLY PRN
Status: DISCONTINUED | OUTPATIENT
Start: 2018-03-28 | End: 2018-03-31 | Stop reason: HOSPADM

## 2018-03-28 RX ORDER — METHYLERGONOVINE MALEATE 0.2 MG/ML
200 INJECTION INTRAVENOUS ONCE AS NEEDED
Status: DISCONTINUED | OUTPATIENT
Start: 2018-03-28 | End: 2018-03-31 | Stop reason: HOSPADM

## 2018-03-28 RX ORDER — ERYTHROMYCIN 5 MG/G
OINTMENT OPHTHALMIC
Status: DISPENSED
Start: 2018-03-28 | End: 2018-03-28

## 2018-03-28 RX ORDER — ONDANSETRON 2 MG/ML
4 INJECTION INTRAMUSCULAR; INTRAVENOUS ONCE AS NEEDED
Status: CANCELLED | OUTPATIENT
Start: 2018-03-28

## 2018-03-28 RX ORDER — ACETAMINOPHEN 325 MG/1
650 TABLET ORAL EVERY 4 HOURS PRN
Status: DISCONTINUED | OUTPATIENT
Start: 2018-03-28 | End: 2018-03-31 | Stop reason: HOSPADM

## 2018-03-28 RX ORDER — LIDOCAINE HYDROCHLORIDE 10 MG/ML
5 INJECTION, SOLUTION EPIDURAL; INFILTRATION; INTRACAUDAL; PERINEURAL AS NEEDED
Status: DISCONTINUED | OUTPATIENT
Start: 2018-03-28 | End: 2018-03-31 | Stop reason: HOSPADM

## 2018-03-28 RX ORDER — PROMETHAZINE HYDROCHLORIDE 25 MG/ML
12.5 INJECTION, SOLUTION INTRAMUSCULAR; INTRAVENOUS EVERY 4 HOURS PRN
Status: DISCONTINUED | OUTPATIENT
Start: 2018-03-28 | End: 2018-03-31 | Stop reason: HOSPADM

## 2018-03-28 RX ORDER — MORPHINE SULFATE 2 MG/ML
2 INJECTION, SOLUTION INTRAMUSCULAR; INTRAVENOUS
Status: CANCELLED | OUTPATIENT
Start: 2018-03-28 | End: 2018-03-29

## 2018-03-28 RX ORDER — DIPHENHYDRAMINE HCL 25 MG
25 CAPSULE ORAL EVERY 4 HOURS PRN
Status: DISCONTINUED | OUTPATIENT
Start: 2018-03-28 | End: 2018-03-31 | Stop reason: HOSPADM

## 2018-03-28 RX ORDER — ONDANSETRON 4 MG/1
4 TABLET, FILM COATED ORAL EVERY 8 HOURS PRN
Status: DISCONTINUED | OUTPATIENT
Start: 2018-03-28 | End: 2018-03-31 | Stop reason: HOSPADM

## 2018-03-28 RX ORDER — NALOXONE HCL 0.4 MG/ML
0.2 VIAL (ML) INJECTION
Status: CANCELLED | OUTPATIENT
Start: 2018-03-28

## 2018-03-28 RX ORDER — LIDOCAINE HYDROCHLORIDE AND EPINEPHRINE 20; 5 MG/ML; UG/ML
INJECTION, SOLUTION EPIDURAL; INFILTRATION; INTRACAUDAL; PERINEURAL AS NEEDED
Status: DISCONTINUED | OUTPATIENT
Start: 2018-03-28 | End: 2018-03-28 | Stop reason: SURG

## 2018-03-28 RX ORDER — MORPHINE SULFATE 1 MG/ML
INJECTION, SOLUTION EPIDURAL; INTRATHECAL; INTRAVENOUS
Status: COMPLETED
Start: 2018-03-28 | End: 2018-03-28

## 2018-03-28 RX ORDER — OXYTOCIN-SODIUM CHLORIDE 0.9% IV SOLN 30 UNIT/500ML 30-0.9/5 UT/ML-%
125 SOLUTION INTRAVENOUS CONTINUOUS PRN
Status: COMPLETED | OUTPATIENT
Start: 2018-03-28 | End: 2018-03-28

## 2018-03-28 RX ORDER — OXYTOCIN-SODIUM CHLORIDE 0.9% IV SOLN 30 UNIT/500ML 30-0.9/5 UT/ML-%
250 SOLUTION INTRAVENOUS CONTINUOUS
Status: ACTIVE | OUTPATIENT
Start: 2018-03-28 | End: 2018-03-28

## 2018-03-28 RX ORDER — SODIUM CHLORIDE 0.9 % (FLUSH) 0.9 %
1-10 SYRINGE (ML) INJECTION AS NEEDED
Status: DISCONTINUED | OUTPATIENT
Start: 2018-03-28 | End: 2018-03-31 | Stop reason: HOSPADM

## 2018-03-28 RX ORDER — PHYTONADIONE 2 MG/ML
INJECTION, EMULSION INTRAMUSCULAR; INTRAVENOUS; SUBCUTANEOUS
Status: DISPENSED
Start: 2018-03-28 | End: 2018-03-28

## 2018-03-28 RX ORDER — ONDANSETRON 2 MG/ML
4 INJECTION INTRAMUSCULAR; INTRAVENOUS EVERY 6 HOURS PRN
Status: DISCONTINUED | OUTPATIENT
Start: 2018-03-28 | End: 2018-03-31 | Stop reason: HOSPADM

## 2018-03-28 RX ADMIN — PHENYLEPHRINE HYDROCHLORIDE 100 MCG: 10 INJECTION INTRAVENOUS at 09:45

## 2018-03-28 RX ADMIN — OXYCODONE HYDROCHLORIDE AND ACETAMINOPHEN 2 TABLET: 5; 325 TABLET ORAL at 13:23

## 2018-03-28 RX ADMIN — SODIUM CHLORIDE, POTASSIUM CHLORIDE, SODIUM LACTATE AND CALCIUM CHLORIDE 1000 ML: 600; 310; 30; 20 INJECTION, SOLUTION INTRAVENOUS at 07:00

## 2018-03-28 RX ADMIN — CEFAZOLIN SODIUM 2 G: 2 INJECTION, SOLUTION INTRAVENOUS at 08:54

## 2018-03-28 RX ADMIN — FAMOTIDINE 20 MG: 10 INJECTION INTRAVENOUS at 08:03

## 2018-03-28 RX ADMIN — OXYTOCIN 125 ML/HR: 10 INJECTION, SOLUTION INTRAMUSCULAR; INTRAVENOUS at 11:12

## 2018-03-28 RX ADMIN — OXYTOCIN 999 ML/HR: 10 INJECTION, SOLUTION INTRAMUSCULAR; INTRAVENOUS at 09:33

## 2018-03-28 RX ADMIN — MORPHINE SULFATE 3 MG: 1 INJECTION EPIDURAL; INTRATHECAL; INTRAVENOUS at 09:34

## 2018-03-28 RX ADMIN — HYDROMORPHONE HYDROCHLORIDE 0.5 MG: 2 INJECTION INTRAMUSCULAR; INTRAVENOUS; SUBCUTANEOUS at 11:27

## 2018-03-28 RX ADMIN — IBUPROFEN 800 MG: 800 TABLET ORAL at 17:59

## 2018-03-28 RX ADMIN — HYDROMORPHONE HYDROCHLORIDE 0.5 MG: 2 INJECTION INTRAMUSCULAR; INTRAVENOUS; SUBCUTANEOUS at 11:07

## 2018-03-28 RX ADMIN — LIDOCAINE HYDROCHLORIDE,EPINEPHRINE BITARTRATE 10 ML: 20; .005 INJECTION, SOLUTION EPIDURAL; INFILTRATION; INTRACAUDAL; PERINEURAL at 09:06

## 2018-03-28 RX ADMIN — OXYCODONE HYDROCHLORIDE AND ACETAMINOPHEN 2 TABLET: 5; 325 TABLET ORAL at 22:38

## 2018-03-28 RX ADMIN — LIDOCAINE HYDROCHLORIDE,EPINEPHRINE BITARTRATE 5 ML: 20; .005 INJECTION, SOLUTION EPIDURAL; INFILTRATION; INTRACAUDAL; PERINEURAL at 09:09

## 2018-03-28 RX ADMIN — SODIUM CHLORIDE, POTASSIUM CHLORIDE, SODIUM LACTATE AND CALCIUM CHLORIDE 250 ML/HR: 600; 310; 30; 20 INJECTION, SOLUTION INTRAVENOUS at 07:58

## 2018-03-28 RX ADMIN — OXYCODONE HYDROCHLORIDE AND ACETAMINOPHEN 2 TABLET: 5; 325 TABLET ORAL at 17:59

## 2018-03-28 RX ADMIN — ONDANSETRON 4 MG: 2 INJECTION INTRAMUSCULAR; INTRAVENOUS at 08:06

## 2018-03-28 RX ADMIN — LIDOCAINE HYDROCHLORIDE,EPINEPHRINE BITARTRATE 5 ML: 20; .005 INJECTION, SOLUTION EPIDURAL; INFILTRATION; INTRACAUDAL; PERINEURAL at 09:13

## 2018-03-28 RX ADMIN — SODIUM CHLORIDE, POTASSIUM CHLORIDE, SODIUM LACTATE AND CALCIUM CHLORIDE: 600; 310; 30; 20 INJECTION, SOLUTION INTRAVENOUS at 09:08

## 2018-03-28 RX ADMIN — ACETAMINOPHEN 1000 MG: 500 TABLET, FILM COATED ORAL at 08:17

## 2018-03-28 RX ADMIN — KETOROLAC TROMETHAMINE 30 MG: 30 INJECTION, SOLUTION INTRAMUSCULAR; INTRAVENOUS at 10:00

## 2018-03-28 NOTE — ANESTHESIA PROCEDURE NOTES
Labor Epidural    Patient location during procedure: OB  Performed By  Anesthesiologist: JERILYN ENGLE  Preanesthetic Checklist  Completed: patient identified, site marked, surgical consent, pre-op evaluation, timeout performed, IV checked, risks and benefits discussed and monitors and equipment checked  Prep:  Pt Position:sitting  Sterile Tech:cap, gloves, mask and sterile barrier  Prep:chlorhexidine gluconate and isopropyl alcohol  Monitoring:blood pressure monitoring, continuous pulse oximetry and EKG  Epidural Block Procedure:  Approach:midline  Guidance:palpation technique  Location:L4-L5  Needle Type:Tuohy  Needle Gauge:17  Loss of Resistance Medium: saline  Cath Depth at skin:11 cm  Paresthesia: none  Aspiration:negative  Test Dose:negative  Number of Attempts: 1  Post Assessment:  Dressing:occlusive dressing applied and secured with tape  Pt Tolerance:patient tolerated the procedure well with no apparent complications  Complications:no

## 2018-03-28 NOTE — ANESTHESIA PREPROCEDURE EVALUATION
Anesthesia Evaluation     Patient summary reviewed and Nursing notes reviewed                Airway   Mallampati: II  TM distance: >3 FB  Neck ROM: full  no difficulty expected  Dental - normal exam     Pulmonary - negative pulmonary ROS and normal exam   Cardiovascular - normal exam  Exercise tolerance: good (4-7 METS)    Rhythm: regular  Rate: normal    (+) hyperlipidemia,       Neuro/Psych- negative ROS  GI/Hepatic/Renal/Endo      Musculoskeletal (-) negative ROS    Abdominal  - normal exam   Substance History - negative use     OB/GYN    (+) Pregnant,         Other - negative ROS                       Anesthesia Plan    ASA 2     epidural     intravenous induction   Anesthetic plan and risks discussed with patient.

## 2018-03-28 NOTE — ANESTHESIA POSTPROCEDURE EVALUATION
Patient: Katt Arias    Procedure Summary     Date:  18 Room / Location:   [facility] /  [facility]    Anesthesia Start:  904 Anesthesia Stop:      Procedure:   SECTION PRIMARY (N/A Abdomen) Diagnosis:       Breech presentation with  problem, single or unspecified fetus      (Breech presentation with  problem, single or unspecified fetus [O32.1XX0])    Surgeon:  Franchesca Tanner MD Provider:  Pema Davey MD    Anesthesia Type:  epidural ASA Status:  2          Anesthesia Type: epidural  Last vitals  BP   105/59 (18 1049)   Temp   36.9 °C (98.5 °F) (18 1049)   Pulse   87 (18 1055)   Resp   16 (18 1049)     SpO2   98 % (18 1055)     Post Anesthesia Care and Evaluation    Patient location during evaluation: PACU  Patient participation: complete - patient participated  Level of consciousness: awake and alert  Pain management: adequate  Airway patency: patent  Anesthetic complications: No anesthetic complications    Cardiovascular status: acceptable  Respiratory status: acceptable  Hydration status: acceptable

## 2018-03-29 LAB
BASOPHILS # BLD AUTO: 0.01 10*3/MM3 (ref 0–0.2)
BASOPHILS NFR BLD AUTO: 0.1 % (ref 0–1.5)
DEPRECATED RDW RBC AUTO: 49 FL (ref 37–54)
EOSINOPHIL # BLD AUTO: 0.03 10*3/MM3 (ref 0–0.7)
EOSINOPHIL NFR BLD AUTO: 0.2 % (ref 0.3–6.2)
ERYTHROCYTE [DISTWIDTH] IN BLOOD BY AUTOMATED COUNT: 13.8 % (ref 11.7–13)
HCT VFR BLD AUTO: 36.9 % (ref 35.6–45.5)
HGB BLD-MCNC: 11.3 G/DL (ref 11.9–15.5)
IMM GRANULOCYTES # BLD: 0.06 10*3/MM3 (ref 0–0.03)
IMM GRANULOCYTES NFR BLD: 0.4 % (ref 0–0.5)
LYMPHOCYTES # BLD AUTO: 1.5 10*3/MM3 (ref 0.9–4.8)
LYMPHOCYTES NFR BLD AUTO: 9.5 % (ref 19.6–45.3)
MCH RBC QN AUTO: 29.8 PG (ref 26.9–32)
MCHC RBC AUTO-ENTMCNC: 30.6 G/DL (ref 32.4–36.3)
MCV RBC AUTO: 97.4 FL (ref 80.5–98.2)
MONOCYTES # BLD AUTO: 1.21 10*3/MM3 (ref 0.2–1.2)
MONOCYTES NFR BLD AUTO: 7.6 % (ref 5–12)
NEUTROPHILS # BLD AUTO: 13.03 10*3/MM3 (ref 1.9–8.1)
NEUTROPHILS NFR BLD AUTO: 82.2 % (ref 42.7–76)
PLATELET # BLD AUTO: 108 10*3/MM3 (ref 140–500)
PMV BLD AUTO: 13.6 FL (ref 6–12)
RBC # BLD AUTO: 3.79 10*6/MM3 (ref 3.9–5.2)
WBC NRBC COR # BLD: 15.84 10*3/MM3 (ref 4.5–10.7)

## 2018-03-29 PROCEDURE — 85025 COMPLETE CBC W/AUTO DIFF WBC: CPT | Performed by: OBSTETRICS & GYNECOLOGY

## 2018-03-29 PROCEDURE — 99024 POSTOP FOLLOW-UP VISIT: CPT | Performed by: OBSTETRICS & GYNECOLOGY

## 2018-03-29 PROCEDURE — 25010000002 ONDANSETRON PER 1 MG: Performed by: OBSTETRICS & GYNECOLOGY

## 2018-03-29 RX ADMIN — OXYCODONE HYDROCHLORIDE AND ACETAMINOPHEN 1 TABLET: 5; 325 TABLET ORAL at 14:01

## 2018-03-29 RX ADMIN — IBUPROFEN 800 MG: 800 TABLET ORAL at 03:01

## 2018-03-29 RX ADMIN — IBUPROFEN 800 MG: 800 TABLET ORAL at 12:32

## 2018-03-29 RX ADMIN — ONDANSETRON 4 MG: 4 TABLET, FILM COATED ORAL at 15:12

## 2018-03-29 RX ADMIN — OXYCODONE HYDROCHLORIDE AND ACETAMINOPHEN 1 TABLET: 5; 325 TABLET ORAL at 18:11

## 2018-03-29 RX ADMIN — OXYCODONE HYDROCHLORIDE AND ACETAMINOPHEN 2 TABLET: 5; 325 TABLET ORAL at 04:08

## 2018-03-29 RX ADMIN — IBUPROFEN 800 MG: 800 TABLET ORAL at 22:48

## 2018-03-29 RX ADMIN — OXYCODONE HYDROCHLORIDE AND ACETAMINOPHEN 1 TABLET: 5; 325 TABLET ORAL at 08:53

## 2018-03-29 RX ADMIN — ONDANSETRON 4 MG: 2 INJECTION INTRAMUSCULAR; INTRAVENOUS at 08:53

## 2018-03-29 RX ADMIN — OXYCODONE HYDROCHLORIDE AND ACETAMINOPHEN 1 TABLET: 5; 325 TABLET ORAL at 22:47

## 2018-03-30 RX ADMIN — ONDANSETRON 4 MG: 4 TABLET, FILM COATED ORAL at 08:19

## 2018-03-30 RX ADMIN — OXYCODONE HYDROCHLORIDE AND ACETAMINOPHEN 1 TABLET: 5; 325 TABLET ORAL at 12:12

## 2018-03-30 RX ADMIN — IBUPROFEN 800 MG: 800 TABLET ORAL at 16:24

## 2018-03-30 RX ADMIN — DOCUSATE SODIUM 100 MG: 100 CAPSULE, LIQUID FILLED ORAL at 03:43

## 2018-03-30 RX ADMIN — DOCUSATE SODIUM 100 MG: 100 CAPSULE, LIQUID FILLED ORAL at 20:44

## 2018-03-30 RX ADMIN — OXYCODONE HYDROCHLORIDE AND ACETAMINOPHEN 2 TABLET: 5; 325 TABLET ORAL at 16:24

## 2018-03-30 RX ADMIN — OXYCODONE HYDROCHLORIDE AND ACETAMINOPHEN 1 TABLET: 5; 325 TABLET ORAL at 03:43

## 2018-03-30 RX ADMIN — OXYCODONE HYDROCHLORIDE AND ACETAMINOPHEN 1 TABLET: 5; 325 TABLET ORAL at 20:44

## 2018-03-30 RX ADMIN — Medication 1 TABLET: at 08:07

## 2018-03-30 RX ADMIN — ONDANSETRON 4 MG: 4 TABLET, FILM COATED ORAL at 16:26

## 2018-03-30 RX ADMIN — OXYCODONE HYDROCHLORIDE AND ACETAMINOPHEN 2 TABLET: 5; 325 TABLET ORAL at 08:08

## 2018-03-30 RX ADMIN — IBUPROFEN 800 MG: 800 TABLET ORAL at 08:07

## 2018-03-31 VITALS
DIASTOLIC BLOOD PRESSURE: 71 MMHG | RESPIRATION RATE: 16 BRPM | OXYGEN SATURATION: 100 % | HEART RATE: 78 BPM | HEIGHT: 63 IN | BODY MASS INDEX: 26.75 KG/M2 | WEIGHT: 151 LBS | TEMPERATURE: 97.6 F | SYSTOLIC BLOOD PRESSURE: 108 MMHG

## 2018-03-31 PROCEDURE — 99024 POSTOP FOLLOW-UP VISIT: CPT | Performed by: OBSTETRICS & GYNECOLOGY

## 2018-03-31 RX ORDER — OXYCODONE HYDROCHLORIDE AND ACETAMINOPHEN 5; 325 MG/1; MG/1
1-2 TABLET ORAL EVERY 4 HOURS PRN
Qty: 30 TABLET | Refills: 0 | Status: SHIPPED | OUTPATIENT
Start: 2018-03-31 | End: 2018-04-07

## 2018-03-31 RX ORDER — IBUPROFEN 800 MG/1
800 TABLET ORAL EVERY 8 HOURS PRN
Qty: 30 TABLET | Refills: 0 | Status: SHIPPED | OUTPATIENT
Start: 2018-03-31 | End: 2019-09-30

## 2018-03-31 RX ORDER — ONDANSETRON 4 MG/1
4 TABLET, FILM COATED ORAL EVERY 8 HOURS PRN
Qty: 20 TABLET | Refills: 0 | Status: SHIPPED | OUTPATIENT
Start: 2018-03-31 | End: 2018-05-09

## 2018-03-31 RX ADMIN — DOCUSATE SODIUM 100 MG: 100 CAPSULE, LIQUID FILLED ORAL at 08:33

## 2018-03-31 RX ADMIN — Medication 1 TABLET: at 08:33

## 2018-03-31 RX ADMIN — OXYCODONE HYDROCHLORIDE AND ACETAMINOPHEN 1 TABLET: 5; 325 TABLET ORAL at 06:49

## 2018-03-31 RX ADMIN — IBUPROFEN 800 MG: 800 TABLET ORAL at 00:33

## 2018-03-31 RX ADMIN — OXYCODONE HYDROCHLORIDE AND ACETAMINOPHEN 1 TABLET: 5; 325 TABLET ORAL at 11:00

## 2018-03-31 RX ADMIN — IBUPROFEN 800 MG: 800 TABLET ORAL at 08:33

## 2018-03-31 RX ADMIN — OXYCODONE HYDROCHLORIDE AND ACETAMINOPHEN 1 TABLET: 5; 325 TABLET ORAL at 01:27

## 2018-04-03 ENCOUNTER — TELEPHONE (OUTPATIENT)
Dept: OBSTETRICS AND GYNECOLOGY | Facility: CLINIC | Age: 23
End: 2018-04-03

## 2018-04-03 NOTE — TELEPHONE ENCOUNTER
It can be normal to bleed off and on for 6 weeks after delivery.  Tell her it could restart and this is normal.  Bleeding that is concerning is heavy bleeding that is soaking through a pad an hour.    ----- Message from Carol Gardiner sent at 4/3/2018  3:25 PM EDT -----  Contact: Pt  PO pt called to report she bled for 2 days after her , but has not bled since she has been home. Is this normal?    Pt # [phone]

## 2018-04-10 ENCOUNTER — TELEPHONE (OUTPATIENT)
Dept: OBSTETRICS AND GYNECOLOGY | Facility: CLINIC | Age: 23
End: 2018-04-10

## 2018-04-10 NOTE — TELEPHONE ENCOUNTER
Neeta,     Ok to take a bath after 10-14 days, so she should be fine at this point.      Thanks   Dr. West    ----- Message from Cynthia Reyer sent at 4/10/2018  1:20 PM EDT -----  (Flores lewis) pt had a  on 3/28 and asks when she can take a bath. She said she is not experiencing any problems and the incision is scabbing up and not red or inflamed. Please advise. # [phone]

## 2018-04-12 ENCOUNTER — TELEPHONE (OUTPATIENT)
Dept: OBSTETRICS AND GYNECOLOGY | Facility: CLINIC | Age: 23
End: 2018-04-12

## 2018-04-12 NOTE — TELEPHONE ENCOUNTER
----- Message from Jodi Reis sent at 4/12/2018  1:47 PM EDT -----  PO c section pt called stating she noticed she is having a few yellow spots with pink color around the incision and slight drainage in the yellow spots. No fever, just sore. I scheduled her for tomorrow to see you.     Please advise.    Pt # is [phone]

## 2018-04-13 ENCOUNTER — OFFICE VISIT (OUTPATIENT)
Dept: OBSTETRICS AND GYNECOLOGY | Facility: CLINIC | Age: 23
End: 2018-04-13

## 2018-04-13 VITALS
WEIGHT: 133 LBS | DIASTOLIC BLOOD PRESSURE: 82 MMHG | BODY MASS INDEX: 23.57 KG/M2 | SYSTOLIC BLOOD PRESSURE: 114 MMHG | HEIGHT: 63 IN | HEART RATE: 88 BPM

## 2018-04-13 DIAGNOSIS — Z48.89 ENCOUNTER FOR POSTOPERATIVE WOUND CHECK: Primary | ICD-10-CM

## 2018-04-13 PROCEDURE — 0503F POSTPARTUM CARE VISIT: CPT | Performed by: OBSTETRICS & GYNECOLOGY

## 2018-04-13 NOTE — PROGRESS NOTES
Subjective   Katt Arias is a 23 y.o. female here for 2 week post op .     History of Present Illness   Patient is a 23-year-old who is 2 weeks status post primary  at term for breech presentation.  Patient presents today for her incision check.  Patient reports concerns that she could've a possible infection.  She reports a small amount of yellow discharge from the incision.  She also reports a slight increase in pain over the last several days but she does report an increase in activity.  She denies any fevers or chills.  She is only taking ibuprofen for the pain.    The following portions of the patient's history were reviewed and updated as appropriate: allergies, current medications, past family history, past medical history, past social history, past surgical history and problem list.    Review of Systems   Constitutional: Negative for chills and fever.   Gastrointestinal: Positive for abdominal pain.   All other systems reviewed and are negative.      Objective   Physical Exam   Constitutional: She appears well-developed and well-nourished.   Cardiovascular: Normal rate and regular rhythm.    Pulmonary/Chest: Effort normal and breath sounds normal.   Abdominal: Soft. She exhibits no distension. There is no tenderness.   Incision healing well.  Intact with no abnormal drainage, warmth, or redness.   Neurological: She is alert.   Psychiatric: She has a normal mood and affect.   Vitals reviewed.        Assessment/Plan   Katt was seen today for postpartum care.    Diagnoses and all orders for this visit:    Encounter for postoperative wound check    Incision is healing well.  No signs of infection.  Discussed with patient the normal healing process and concerning signs and symptoms that are related to infection.  Also discussed postoperative wound care.  She will follow-up in 4 weeks for postpartum physical.

## 2018-05-09 ENCOUNTER — POSTPARTUM VISIT (OUTPATIENT)
Dept: OBSTETRICS AND GYNECOLOGY | Facility: CLINIC | Age: 23
End: 2018-05-09

## 2018-05-09 VITALS
HEART RATE: 79 BPM | SYSTOLIC BLOOD PRESSURE: 110 MMHG | WEIGHT: 129.6 LBS | DIASTOLIC BLOOD PRESSURE: 79 MMHG | BODY MASS INDEX: 22.96 KG/M2 | HEIGHT: 63 IN

## 2018-05-09 DIAGNOSIS — Z30.011 ENCOUNTER FOR INITIAL PRESCRIPTION OF CONTRACEPTIVE PILLS: ICD-10-CM

## 2018-05-09 DIAGNOSIS — Z12.4 PAP SMEAR FOR CERVICAL CANCER SCREENING: ICD-10-CM

## 2018-05-09 PROCEDURE — 0503F POSTPARTUM CARE VISIT: CPT | Performed by: OBSTETRICS & GYNECOLOGY

## 2018-05-09 RX ORDER — HEPATITIS A VACCINE 1440 [IU]/ML
INJECTION, SUSPENSION INTRAMUSCULAR
Refills: 1 | COMMUNITY
Start: 2018-04-29 | End: 2019-07-08

## 2018-05-09 RX ORDER — NORGESTIMATE AND ETHINYL ESTRADIOL 0.25-0.035
1 KIT ORAL DAILY
Qty: 28 TABLET | Refills: 11 | Status: SHIPPED | OUTPATIENT
Start: 2018-05-09 | End: 2018-06-06

## 2018-05-09 NOTE — PROGRESS NOTES
Subjective   Katt Arias is a 23 y.o. female here for 6 week post partum exam.      History of Present Illness   Patient is a 23-year-old  who is 6 weeks status post primary  section at term for breech presentation.  Patient has no complaints today.  She is bottle feeding.  She is currently having a period.  She denies any concerns for postpartum depression or anxiety.  Patient denies any pain and is voiding without difficulty and having normal bowel movements.    The following portions of the patient's history were reviewed and updated as appropriate: allergies, current medications, past family history, past medical history, past social history, past surgical history and problem list.    Review of Systems   Gastrointestinal: Negative for abdominal pain.   Genitourinary: Negative for pelvic pain.   All other systems reviewed and are negative.      Objective   Physical Exam  Physical Exam   Constitutional: She appears well-developed and well-nourished.   Cardiovascular: Normal rate and regular rhythm.    Pulmonary/Chest: Effort normal and breath sounds normal. Right breast exhibits no inverted nipple, no mass, no nipple discharge, no skin change and no tenderness. Left breast exhibits no inverted nipple, no mass, no nipple discharge, no skin change and no tenderness.   Abdominal: Soft. She exhibits no distension. There is no tenderness.  Incision healing well.  Genitourinary: Vagina normal and uterus normal. There is no rash, tenderness, lesion or injury on the right labia. There is no rash, tenderness, lesion or injury on the left labia. Cervix exhibits no motion tenderness, no discharge and no friability. Right adnexum displays no mass, no tenderness and no fullness. Left adnexum displays no mass, no tenderness and no fullness.   Neurological: She is alert.   Psychiatric: She has a normal mood and affect.   Vitals reviewed.      Assessment/Plan   Katt was seen today for postpartum  care.    Diagnoses and all orders for this visit:    Routine postpartum follow-up    Encounter for initial prescription of contraceptive pills  -     norgestimate-ethinyl estradiol (SPRINTEC 28) 0.25-35 MG-MCG per tablet; Take 1 tablet by mouth Daily for 28 days.    Pap smear for cervical cancer screening  -     IGP, Rfx Aptima HPV ASCU    Patient is doing well postpartum.  She elects for oral contraceptive pills for contraception.  A prescription was sent and she may start those today.  A screening Pap smear was collected.  Patient may follow-up in 1 year for next annual exam or sooner if needed.

## 2018-05-10 ENCOUNTER — TELEPHONE (OUTPATIENT)
Dept: OBSTETRICS AND GYNECOLOGY | Facility: CLINIC | Age: 23
End: 2018-05-10

## 2018-05-10 NOTE — TELEPHONE ENCOUNTER
Yes, she may do all those things.  She has no restrictions.    ----- Message from Cynthia Reyer sent at 5/10/2018  1:16 PM EDT -----  Pt said she was told she can return to work with no restrictions. However, she does have a few small areas of her  incision that keep scabbing up. Sometimes the scabs come off and then the area re-scabs. She forgot to ask if she is able to take a bath, swim in lakes, pools and get in tanning beds. She just wants to make sure it's okay before she does any of these things. # [phone]

## 2018-05-15 LAB
CONV .: ABNORMAL
CYTOLOGIST CVX/VAG CYTO: ABNORMAL
CYTOLOGY CVX/VAG DOC THIN PREP: ABNORMAL
DX ICD CODE: ABNORMAL
DX ICD CODE: ABNORMAL
HIV 1 & 2 AB SER-IMP: ABNORMAL
HPV I/H RISK 4 DNA CVX QL PROBE+SIG AMP: NEGATIVE
OTHER STN SPEC: ABNORMAL
PATH REPORT.FINAL DX SPEC: ABNORMAL
PATHOLOGIST CVX/VAG CYTO: ABNORMAL
STAT OF ADQ CVX/VAG CYTO-IMP: ABNORMAL

## 2018-05-18 ENCOUNTER — TELEPHONE (OUTPATIENT)
Dept: OBSTETRICS AND GYNECOLOGY | Facility: CLINIC | Age: 23
End: 2018-05-18

## 2018-06-01 ENCOUNTER — TELEPHONE (OUTPATIENT)
Dept: OBSTETRICS AND GYNECOLOGY | Facility: CLINIC | Age: 23
End: 2018-06-01

## 2018-06-01 NOTE — TELEPHONE ENCOUNTER
This is completely normal.  Anytime you get off schedule with the pill, you can expect irregular bleeding.  It should re-regulate with her next pill pack if she stays on schedule.    Pt called stating that she had missed a pill in her birth control pack so she took 2 in the same day. She stated that ever since then she has been bleeding. She stated that this has been going on for about 8 days. She was not sure if this is normal when you miss a pill or if she needed to come in to be seen.     Call back # [phone]

## 2018-06-22 ENCOUNTER — OFFICE VISIT (OUTPATIENT)
Dept: OBSTETRICS AND GYNECOLOGY | Facility: CLINIC | Age: 23
End: 2018-06-22

## 2018-06-22 VITALS
HEIGHT: 63 IN | HEART RATE: 80 BPM | SYSTOLIC BLOOD PRESSURE: 121 MMHG | BODY MASS INDEX: 22.08 KG/M2 | DIASTOLIC BLOOD PRESSURE: 79 MMHG | WEIGHT: 124.6 LBS

## 2018-06-22 DIAGNOSIS — B37.31 YEAST VAGINITIS: Primary | ICD-10-CM

## 2018-06-22 PROCEDURE — 99213 OFFICE O/P EST LOW 20 MIN: CPT | Performed by: OBSTETRICS & GYNECOLOGY

## 2018-06-22 RX ORDER — FLUCONAZOLE 150 MG/1
150 TABLET ORAL ONCE
Qty: 2 TABLET | Refills: 0 | Status: SHIPPED | OUTPATIENT
Start: 2018-06-22 | End: 2018-06-22

## 2018-06-22 NOTE — PROGRESS NOTES
Subjective   Katt Arias is a 23 y.o. female here for vaginal pain and discharge.  Pt states 2 days ago she started having vaginal burning and itching on the inside and outside.  Pt states she may have some discharge and no odor      History of Present Illness   Patient is a 23-year-old who presents with 2 days of vaginal burning and itching.  She does report a slight increase in discharge but denies any odor.  She last had sexual intercourse yesterday and denies pain with intercourse but states she did have some soreness.  She denies any problems with urination.    The following portions of the patient's history were reviewed and updated as appropriate: allergies, current medications, past family history, past medical history, past social history, past surgical history and problem list.    Review of Systems   Genitourinary: Positive for vaginal discharge.   All other systems reviewed and are negative.      Objective   Physical Exam   Constitutional: She appears well-developed and well-nourished.   Cardiovascular: Normal rate and regular rhythm.    Pulmonary/Chest: Effort normal and breath sounds normal.   Abdominal: Soft. She exhibits no distension. There is no tenderness.   Genitourinary: Cervix normal. There is no rash, tenderness or lesion on the right labia. There is no rash, tenderness or lesion on the left labia. Vagina exhibits no lesion. There is tenderness in the vagina. No erythema or bleeding in the vagina. No foreign body in the vagina. No signs of injury around the vagina. Vaginal discharge (large amount of thick, white discharge) found.   Neurological: She is alert.   Psychiatric: She has a normal mood and affect.   Vitals reviewed.        Assessment/Plan   Katt was seen today for vaginal discharge.    Diagnoses and all orders for this visit:    Yeast vaginitis  -     fluconazole (DIFLUCAN) 150 MG tablet; Take 1 tablet by mouth 1 (One) Time for 1 dose. May repeat dose in 72 hours if symptoms not  improved  -     NuSwab VG+ - Swab, Vagina    Exam consistent with yeast infection and rx sent for treatment.  Will contact patient with results of vaginal culture.

## 2018-06-25 ENCOUNTER — TELEPHONE (OUTPATIENT)
Dept: OBSTETRICS AND GYNECOLOGY | Facility: CLINIC | Age: 23
End: 2018-06-25

## 2018-06-25 LAB
A VAGINAE DNA VAG QL NAA+PROBE: ABNORMAL SCORE
BVAB2 DNA VAG QL NAA+PROBE: ABNORMAL SCORE
C ALBICANS DNA VAG QL NAA+PROBE: POSITIVE
C GLABRATA DNA VAG QL NAA+PROBE: NEGATIVE
C TRACH RRNA SPEC QL NAA+PROBE: NEGATIVE
MEGA1 DNA VAG QL NAA+PROBE: ABNORMAL SCORE
N GONORRHOEA RRNA SPEC QL NAA+PROBE: NEGATIVE
T VAGINALIS RRNA SPEC QL NAA+PROBE: NEGATIVE

## 2018-06-25 RX ORDER — METRONIDAZOLE 500 MG/1
500 TABLET ORAL 2 TIMES DAILY
Qty: 14 TABLET | Refills: 0 | Status: SHIPPED | OUTPATIENT
Start: 2018-06-25 | End: 2018-07-02

## 2018-06-25 NOTE — TELEPHONE ENCOUNTER
----- Message from Franchesca Tanner MD sent at 6/25/2018  2:38 PM EDT -----  Let patient know her vaginal culture was positive for yeast infection, which she was given rx for in office, and BV, and I have sent in another antibiotic for the BV.

## 2018-10-15 ENCOUNTER — TELEPHONE (OUTPATIENT)
Dept: OBSTETRICS AND GYNECOLOGY | Facility: CLINIC | Age: 23
End: 2018-10-15

## 2018-10-15 RX ORDER — FLUCONAZOLE 150 MG/1
150 TABLET ORAL ONCE
Qty: 1 TABLET | Refills: 1 | Status: SHIPPED | OUTPATIENT
Start: 2018-10-15 | End: 2018-10-15

## 2018-10-15 NOTE — TELEPHONE ENCOUNTER
I have sent in rx for diflucan.  If symptoms not better after diflucan, she needs appt.    Pt was on an antibiotic a week or 2 ago because she had her wisdom teeth removed and then got a bad blister in her mouth. She now has some symptoms of a yeast infection. She used Monistat One but it didn't completely help. She still has some vaginal soreness and a raw feeling on and off when she urinates. She said the itching has subsided mostly but she does have discomfort with sexual intercourse. She is asking if you can prescribe her something to help. RX # in chart, call back # [phone]

## 2018-12-26 ENCOUNTER — TELEPHONE (OUTPATIENT)
Dept: OBSTETRICS AND GYNECOLOGY | Facility: CLINIC | Age: 23
End: 2018-12-26

## 2018-12-26 RX ORDER — FLUCONAZOLE 150 MG/1
150 TABLET ORAL DAILY
Qty: 1 TABLET | Refills: 0 | Status: SHIPPED | OUTPATIENT
Start: 2018-12-26 | End: 2019-07-08

## 2018-12-26 NOTE — TELEPHONE ENCOUNTER
Ellen    I called in medication for yeast infection.  For now, I would stay on the same birth control.  If the infection does not improve after taking Diflucan, she will need to be seen.    Lela erwin pt called to request rx for yeast infection.    Pt also asking if her bc pills could be causing her to have more frequent yeast infections.  Pt currently on SPRINTEC 28 0.25-35 MG-MCG per tablet.  Please advise.    Pt # [phone]

## 2019-04-04 ENCOUNTER — TELEPHONE (OUTPATIENT)
Dept: OBSTETRICS AND GYNECOLOGY | Facility: CLINIC | Age: 24
End: 2019-04-04

## 2019-04-04 RX ORDER — FLUCONAZOLE 150 MG/1
150 TABLET ORAL DAILY
Qty: 1 TABLET | Refills: 0 | Status: SHIPPED | OUTPATIENT
Start: 2019-04-04 | End: 2019-07-08

## 2019-07-08 ENCOUNTER — PROCEDURE VISIT (OUTPATIENT)
Dept: OBSTETRICS AND GYNECOLOGY | Facility: CLINIC | Age: 24
End: 2019-07-08

## 2019-07-08 ENCOUNTER — INITIAL PRENATAL (OUTPATIENT)
Dept: OBSTETRICS AND GYNECOLOGY | Facility: CLINIC | Age: 24
End: 2019-07-08

## 2019-07-08 VITALS — WEIGHT: 114.8 LBS | SYSTOLIC BLOOD PRESSURE: 106 MMHG | BODY MASS INDEX: 20.34 KG/M2 | DIASTOLIC BLOOD PRESSURE: 74 MMHG

## 2019-07-08 DIAGNOSIS — Z34.90 EARLY STAGE OF PREGNANCY: Primary | ICD-10-CM

## 2019-07-08 DIAGNOSIS — O36.80X0 ENCOUNTER TO DETERMINE FETAL VIABILITY OF PREGNANCY, SINGLE OR UNSPECIFIED FETUS: Primary | ICD-10-CM

## 2019-07-08 PROBLEM — Z98.891 S/P CESAREAN SECTION: Status: RESOLVED | Noted: 2018-03-28 | Resolved: 2019-07-08

## 2019-07-08 PROBLEM — O34.219 PREVIOUS CESAREAN DELIVERY AFFECTING PREGNANCY: Status: ACTIVE | Noted: 2019-07-08

## 2019-07-08 PROCEDURE — 81025 URINE PREGNANCY TEST: CPT | Performed by: OBSTETRICS & GYNECOLOGY

## 2019-07-08 PROCEDURE — 99214 OFFICE O/P EST MOD 30 MIN: CPT | Performed by: OBSTETRICS & GYNECOLOGY

## 2019-07-08 PROCEDURE — 76817 TRANSVAGINAL US OBSTETRIC: CPT | Performed by: OBSTETRICS & GYNECOLOGY

## 2019-07-08 RX ORDER — PROMETHAZINE HYDROCHLORIDE 25 MG/1
25 TABLET ORAL EVERY 6 HOURS PRN
Qty: 30 TABLET | Refills: 0 | Status: SHIPPED | OUTPATIENT
Start: 2019-07-08 | End: 2019-09-30

## 2019-07-08 NOTE — PROGRESS NOTES
CC:  Initial ob visit  Patient presents for initial OB visit.  Patient states pregnancy was unplanned and was a surprise.  She states that her LMP is an estimate.  Patient reports nausea with few episodes of vomiting.  She is requesting medication and a prescription was sent for Phenergan as needed.  Patient also complains of some pelvic cramping, but denies any bleeding.  This is patient's second pregnancy and her first delivery was a primary  at term for breech presentation.  Patient is taking an over-the-counter prenatal vitamin.  OB lab work was ordered today along with dating/viability ultrasound.  A/P: Supervision of early pregnancy with unsure LMP  --OB lab work and dating/viability ultrasound ordered  --Follow-up with me in 4 weeks  Counseling was given to patient for the following topics: instructions for management, patient and family education and impressions . Total time of the encounter was 25 minutes and 15 minutes was spend counseling.

## 2019-07-09 LAB
ABO GROUP BLD: (no result)
B-HCG UR QL: POSITIVE
BASOPHILS # BLD AUTO: 0 X10E3/UL (ref 0–0.2)
BASOPHILS NFR BLD AUTO: 0 %
BLD GP AB SCN SERPL QL: NEGATIVE
EOSINOPHIL # BLD AUTO: 0 X10E3/UL (ref 0–0.4)
EOSINOPHIL NFR BLD AUTO: 0 %
ERYTHROCYTE [DISTWIDTH] IN BLOOD BY AUTOMATED COUNT: 12.2 % (ref 12.3–15.4)
HBV SURFACE AG SERPL QL IA: NEGATIVE
HCT VFR BLD AUTO: 39.2 % (ref 34–46.6)
HCV AB S/CO SERPL IA: 0.2 S/CO RATIO (ref 0–0.9)
HGB BLD-MCNC: 13 G/DL (ref 11.1–15.9)
HIV 1+2 AB+HIV1 P24 AG SERPL QL IA: NON REACTIVE
IMM GRANULOCYTES # BLD AUTO: 0 X10E3/UL (ref 0–0.1)
IMM GRANULOCYTES NFR BLD AUTO: 0 %
INTERNAL NEGATIVE CONTROL: NEGATIVE
INTERNAL POSITIVE CONTROL: POSITIVE
LYMPHOCYTES # BLD AUTO: 1.9 X10E3/UL (ref 0.7–3.1)
LYMPHOCYTES NFR BLD AUTO: 26 %
Lab: ABNORMAL
MCH RBC QN AUTO: 30 PG (ref 26.6–33)
MCHC RBC AUTO-ENTMCNC: 33.2 G/DL (ref 31.5–35.7)
MCV RBC AUTO: 91 FL (ref 79–97)
MONOCYTES # BLD AUTO: 0.5 X10E3/UL (ref 0.1–0.9)
MONOCYTES NFR BLD AUTO: 7 %
NEUTROPHILS # BLD AUTO: 4.9 X10E3/UL (ref 1.4–7)
NEUTROPHILS NFR BLD AUTO: 67 %
PLATELET # BLD AUTO: 213 X10E3/UL (ref 150–450)
RBC # BLD AUTO: 4.33 X10E6/UL (ref 3.77–5.28)
RH BLD: POSITIVE
RPR SER QL: NON REACTIVE
RUBV IGG SERPL IA-ACNC: 6.62 INDEX
WBC # BLD AUTO: 7.3 X10E3/UL (ref 3.4–10.8)

## 2019-07-10 LAB
BACTERIA UR CULT: NORMAL
BACTERIA UR CULT: NORMAL
C TRACH RRNA CVX QL NAA+PROBE: NEGATIVE
CONV .: NORMAL
CYTOLOGIST CVX/VAG CYTO: NORMAL
CYTOLOGY CVX/VAG DOC CYTO: NORMAL
CYTOLOGY CVX/VAG DOC THIN PREP: NORMAL
DX ICD CODE: NORMAL
HIV 1 & 2 AB SER-IMP: NORMAL
N GONORRHOEA RRNA CVX QL NAA+PROBE: NEGATIVE
OTHER STN SPEC: NORMAL
STAT OF ADQ CVX/VAG CYTO-IMP: NORMAL
T VAGINALIS RRNA SPEC QL NAA+PROBE: NEGATIVE

## 2019-07-29 ENCOUNTER — ROUTINE PRENATAL (OUTPATIENT)
Dept: OBSTETRICS AND GYNECOLOGY | Facility: CLINIC | Age: 24
End: 2019-07-29

## 2019-07-29 ENCOUNTER — PROCEDURE VISIT (OUTPATIENT)
Dept: OBSTETRICS AND GYNECOLOGY | Facility: CLINIC | Age: 24
End: 2019-07-29

## 2019-07-29 VITALS — DIASTOLIC BLOOD PRESSURE: 61 MMHG | WEIGHT: 113.8 LBS | SYSTOLIC BLOOD PRESSURE: 98 MMHG | BODY MASS INDEX: 20.16 KG/M2

## 2019-07-29 DIAGNOSIS — O36.80X0 PREGNANCY WITH UNCERTAIN FETAL VIABILITY, SINGLE OR UNSPECIFIED FETUS: Primary | ICD-10-CM

## 2019-07-29 DIAGNOSIS — Z3A.10 10 WEEKS GESTATION OF PREGNANCY: Primary | ICD-10-CM

## 2019-07-29 PROCEDURE — 99213 OFFICE O/P EST LOW 20 MIN: CPT | Performed by: OBSTETRICS & GYNECOLOGY

## 2019-07-29 PROCEDURE — 76801 OB US < 14 WKS SINGLE FETUS: CPT | Performed by: OBSTETRICS & GYNECOLOGY

## 2019-07-29 RX ORDER — PRENATAL WITH FERROUS FUM AND FOLIC ACID 3080; 920; 120; 400; 22; 1.84; 3; 20; 10; 1; 12; 200; 27; 25; 2 [IU]/1; [IU]/1; MG/1; [IU]/1; MG/1; MG/1; MG/1; MG/1; MG/1; MG/1; UG/1; MG/1; MG/1; MG/1; MG/1
1 TABLET ORAL DAILY
Qty: 30 TABLET | Refills: 11 | Status: SHIPPED | OUTPATIENT
Start: 2019-07-29 | End: 2019-08-28

## 2019-07-29 NOTE — PROGRESS NOTES
CC:  Pregnancy  Pt c/o nausea and vomiting.  Patient is taking Phenergan and states it does help with her nausea.  Discussed other methods including ramon.  Reviewed initial dating ultrasound and OB labs.  A/P:  Supervision of pregnancy at 10 weeks  --Followup in 4 weeks  Counseling was given to patient for the following topics: diagnostic results, instructions for management and patient and family education . Total time of the encounter was 15 minutes and 10 minutes was spend counseling.

## 2019-08-08 ENCOUNTER — TELEPHONE (OUTPATIENT)
Dept: OBSTETRICS AND GYNECOLOGY | Facility: CLINIC | Age: 24
End: 2019-08-08

## 2019-08-08 NOTE — TELEPHONE ENCOUNTER
Pt c/o yeast infection and wondering what she can take.  Advised patient to try OTC miconazole.  Pt agreed.

## 2019-08-12 ENCOUNTER — TELEPHONE (OUTPATIENT)
Dept: OBSTETRICS AND GYNECOLOGY | Facility: CLINIC | Age: 24
End: 2019-08-12

## 2019-08-12 RX ORDER — FLUCONAZOLE 150 MG/1
150 TABLET ORAL DAILY
Qty: 1 TABLET | Refills: 0 | Status: SHIPPED | OUTPATIENT
Start: 2019-08-12 | End: 2019-08-26

## 2019-08-12 NOTE — TELEPHONE ENCOUNTER
I have sent in diflucan.  Patient has no risk factors for diabetes and was screened for diabetes in her previous pregnancy and it was negative.  We can discuss this further at her next scheduled visit.

## 2019-08-12 NOTE — TELEPHONE ENCOUNTER
OB pt called and spoke with on call doctor on 08/08/2019 and she stated she had called 08/11/2019 ( I did not see note for that date yet) Pt has tried the 3 day monistat and she did not get any relief. She states that monistat never works for her and she is requesting Diflucan. Pt also voiced a concern about a possibility of being diabetic ( she has not been tested before). Do you want me to work her in sometime this week? She does have an upcoming appt 08/26/2019.      Thank you

## 2019-08-26 ENCOUNTER — ROUTINE PRENATAL (OUTPATIENT)
Dept: OBSTETRICS AND GYNECOLOGY | Facility: CLINIC | Age: 24
End: 2019-08-26

## 2019-08-26 VITALS — BODY MASS INDEX: 20.13 KG/M2 | SYSTOLIC BLOOD PRESSURE: 104 MMHG | WEIGHT: 113.6 LBS | DIASTOLIC BLOOD PRESSURE: 71 MMHG

## 2019-08-26 DIAGNOSIS — Z34.82 ENCOUNTER FOR SUPERVISION OF OTHER NORMAL PREGNANCY IN SECOND TRIMESTER: Primary | ICD-10-CM

## 2019-08-26 PROCEDURE — 99213 OFFICE O/P EST LOW 20 MIN: CPT | Performed by: OBSTETRICS & GYNECOLOGY

## 2019-08-26 NOTE — PROGRESS NOTES
CC:  Pregnancy  Patient reports that her nausea and vomiting is slowly improving.  Patient reports recurrent yeast infections and is concerned that she has diabetes.  Explained to her that she has very few risk factors for diabetes and that we will screen for this at 24 to 28 weeks.  Discussed genetic screening options and patient desires cell free DNA testing.  A/P: Supervision of normal pregnancy at 14 weeks  --[id number] today  --Followup in 5 weeks with anatomy u/s  Counseling was given to patient for the following topics: instructions for management and patient and family education . Total time of the encounter was 15 minutes and 10 minutes was spend counseling.

## 2019-08-30 LAB
CFDNA.FET/CFDNA.TOTAL SFR FETUS: NORMAL %
CITATION REF LAB TEST: NORMAL
FET CHR 13 TS PLAS.CFDNA QL: NEGATIVE
FET CHR 13+18+21 TS PLAS.CFDNA QL: NORMAL
FET CHR 18 TS PLAS.CFDNA QL: NEGATIVE
FET CHR 21 TS PLAS.CFDNA QL: NEGATIVE
FET Y CHROM PLAS.CFDNA QL: NOT DETECTED
FET Y CHROM PLAS.CFDNA: NORMAL
LAB DIRECTOR NAME PROVIDER: NORMAL
LABORATORY COMMENT REPORT: NORMAL
LIMITATIONS OF THE TEST: NORMAL
NOTE: NORMAL
REF LAB TEST METHOD: NORMAL
SERVICE CMNT 02-IMP: NORMAL
SERVICE CMNT 03-IMP: NORMAL
SERVICE CMNT-IMP: NORMAL
TEST PERFORMANCE INFO SPEC: NORMAL
TEST PERFORMANCE INFO SPEC: NORMAL

## 2019-09-03 ENCOUNTER — TELEPHONE (OUTPATIENT)
Dept: OBSTETRICS AND GYNECOLOGY | Facility: CLINIC | Age: 24
End: 2019-09-03

## 2019-09-03 NOTE — TELEPHONE ENCOUNTER
----- Message from Franchesca Tanner MD sent at 9/3/2019  9:19 AM EDT -----  Let patient know that her genetic test was normal and if she wants to know gender, it showed a female fetus

## 2019-09-19 ENCOUNTER — TELEPHONE (OUTPATIENT)
Dept: OBSTETRICS AND GYNECOLOGY | Facility: CLINIC | Age: 24
End: 2019-09-19

## 2019-09-30 ENCOUNTER — PROCEDURE VISIT (OUTPATIENT)
Dept: OBSTETRICS AND GYNECOLOGY | Facility: CLINIC | Age: 24
End: 2019-09-30

## 2019-09-30 ENCOUNTER — ROUTINE PRENATAL (OUTPATIENT)
Dept: OBSTETRICS AND GYNECOLOGY | Facility: CLINIC | Age: 24
End: 2019-09-30

## 2019-09-30 VITALS — DIASTOLIC BLOOD PRESSURE: 62 MMHG | SYSTOLIC BLOOD PRESSURE: 92 MMHG | BODY MASS INDEX: 20.91 KG/M2 | WEIGHT: 118 LBS

## 2019-09-30 DIAGNOSIS — Z36.89 ENCOUNTER FOR FETAL ANATOMIC SURVEY: Primary | ICD-10-CM

## 2019-09-30 DIAGNOSIS — Z34.82 ENCOUNTER FOR SUPERVISION OF OTHER NORMAL PREGNANCY IN SECOND TRIMESTER: Primary | ICD-10-CM

## 2019-09-30 PROCEDURE — 99213 OFFICE O/P EST LOW 20 MIN: CPT | Performed by: OBSTETRICS & GYNECOLOGY

## 2019-09-30 PROCEDURE — 76805 OB US >/= 14 WKS SNGL FETUS: CPT | Performed by: OBSTETRICS & GYNECOLOGY

## 2019-09-30 RX ORDER — VITAMIN A, VITAMIN C, VITAMIN D-3, VITAMIN E, VITAMIN B-1, VITAMIN B-2, NIACIN, VITAMIN B-6, CALCIUM, IRON, ZINC, COPPER 4000; 120; 400; 22; 1.84; 3; 20; 10; 1; 12; 200; 27; 25; 2 [IU]/1; MG/1; [IU]/1; MG/1; MG/1; MG/1; MG/1; MG/1; MG/1; UG/1; MG/1; MG/1; MG/1; MG/1
1 TABLET ORAL DAILY
Refills: 11 | COMMUNITY
Start: 2019-09-12 | End: 2022-04-19

## 2019-09-30 NOTE — PROGRESS NOTES
CC:  Pregnancy  Patient has no major complaints.  She reports that her appetite has increased and she has been eating better.  Anatomy ultrasound was performed today and anatomy appears normal.  Ultrasound was reviewed with her.  A/P:  Supervision of normal pregnancy at 19 weeks  --Followup in 4 weeks  Counseling was given to patient for the following topics: diagnostic results, instructions for management and patient and family education . Total time of the encounter was 15 minutes and 10 minutes was spend counseling.

## 2019-10-28 ENCOUNTER — ROUTINE PRENATAL (OUTPATIENT)
Dept: OBSTETRICS AND GYNECOLOGY | Facility: CLINIC | Age: 24
End: 2019-10-28

## 2019-10-28 VITALS — BODY MASS INDEX: 21.79 KG/M2 | DIASTOLIC BLOOD PRESSURE: 70 MMHG | SYSTOLIC BLOOD PRESSURE: 100 MMHG | WEIGHT: 123 LBS

## 2019-10-28 DIAGNOSIS — Z34.82 ENCOUNTER FOR SUPERVISION OF OTHER NORMAL PREGNANCY IN SECOND TRIMESTER: Primary | ICD-10-CM

## 2019-10-28 PROCEDURE — 99213 OFFICE O/P EST LOW 20 MIN: CPT | Performed by: OBSTETRICS & GYNECOLOGY

## 2019-10-28 PROCEDURE — 90674 CCIIV4 VAC NO PRSV 0.5 ML IM: CPT | Performed by: OBSTETRICS & GYNECOLOGY

## 2019-10-28 PROCEDURE — 90471 IMMUNIZATION ADMIN: CPT | Performed by: OBSTETRICS & GYNECOLOGY

## 2019-10-28 NOTE — PROGRESS NOTES
CC:  Pregnancy   Patient has no complaints.  Patient reports lots of fetal movement.  Discussed recommendations for flu shot and she desires.  Discussed 1 hour glucose test at her next visit.  A/P:  Supervision of normal pregnancy at 23 weeks  --Flu shot today  --Followup in 4 weeks  Counseling was given to patient for the following topics: instructions for management and patient and family education . Total time of the encounter was 15 minutes and 10 minutes was spend counseling.

## 2019-11-18 ENCOUNTER — TELEPHONE (OUTPATIENT)
Dept: OBSTETRICS AND GYNECOLOGY | Facility: CLINIC | Age: 24
End: 2019-11-18
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
## 2019-11-18 NOTE — TELEPHONE ENCOUNTER
If the patient's bleeding has stopped and she is feeling the baby move and having no pain, she is fine to wait until her visit next week.  Spotting can be normal during pregnancy as long as it resolves and is not heavy.

## 2019-11-25 ENCOUNTER — ROUTINE PRENATAL (OUTPATIENT)
Dept: OBSTETRICS AND GYNECOLOGY | Facility: CLINIC | Age: 24
End: 2019-11-25

## 2019-11-25 VITALS — SYSTOLIC BLOOD PRESSURE: 106 MMHG | WEIGHT: 129 LBS | DIASTOLIC BLOOD PRESSURE: 65 MMHG | BODY MASS INDEX: 22.86 KG/M2

## 2019-11-25 DIAGNOSIS — Z34.82 ENCOUNTER FOR SUPERVISION OF OTHER NORMAL PREGNANCY IN SECOND TRIMESTER: Primary | ICD-10-CM

## 2019-11-25 LAB
ERYTHROCYTE [DISTWIDTH] IN BLOOD BY AUTOMATED COUNT: 12 % (ref 12.3–15.4)
GLUCOSE 1H P 50 G GLC PO SERPL-MCNC: 86 MG/DL (ref 65–179)
HCT VFR BLD AUTO: 38.9 % (ref 34–46.6)
HGB BLD-MCNC: 12.7 G/DL (ref 12–15.9)
MCH RBC QN AUTO: 30.2 PG (ref 26.6–33)
MCHC RBC AUTO-ENTMCNC: 32.6 G/DL (ref 31.5–35.7)
MCV RBC AUTO: 92.6 FL (ref 79–97)
PLATELET # BLD AUTO: 149 10*3/MM3 (ref 140–450)
RBC # BLD AUTO: 4.2 10*6/MM3 (ref 3.77–5.28)
WBC # BLD AUTO: 9.1 10*3/MM3 (ref 3.4–10.8)

## 2019-11-25 PROCEDURE — 99213 OFFICE O/P EST LOW 20 MIN: CPT | Performed by: OBSTETRICS & GYNECOLOGY

## 2019-11-25 NOTE — PROGRESS NOTES
Chief Complaint   Patient presents with   • Routine Prenatal Visit     HPI- Pt is 24 y.o.  at 27w1d here for prenatal visit.  Patient has no complaints.  She reports feeling well.  She is feeling adequate fetal movement.    ROS-     - No vaginal bleeding    GI- No abdominal pain    /65   Wt 58.5 kg (129 lb)   LMP 2019 (Exact Date)   BMI 22.86 kg/m²   Exam - See flow sheet    Fetal heart rate is normal    Assessment-  Diagnoses and all orders for this visit:    Encounter for supervision of other normal pregnancy in second trimester    Patient is doing her 1 hour glucose test today.  Discussed fetal kick counts in the last trimester.  We will begin every 2-week visits.    Counseling was given to patient for the following topics: diagnostic results, instructions for management and patient and family education . Total time of the encounter was 15 minutes and 10 minutes was spend counseling.

## 2019-11-26 ENCOUNTER — TELEPHONE (OUTPATIENT)
Dept: OBSTETRICS AND GYNECOLOGY | Facility: CLINIC | Age: 24
End: 2019-11-26

## 2019-12-09 ENCOUNTER — ROUTINE PRENATAL (OUTPATIENT)
Dept: OBSTETRICS AND GYNECOLOGY | Facility: CLINIC | Age: 24
End: 2019-12-09

## 2019-12-09 VITALS — WEIGHT: 133 LBS | BODY MASS INDEX: 23.57 KG/M2 | SYSTOLIC BLOOD PRESSURE: 105 MMHG | DIASTOLIC BLOOD PRESSURE: 68 MMHG

## 2019-12-09 DIAGNOSIS — Z34.83 ENCOUNTER FOR SUPERVISION OF OTHER NORMAL PREGNANCY IN THIRD TRIMESTER: Primary | ICD-10-CM

## 2019-12-09 PROCEDURE — 99213 OFFICE O/P EST LOW 20 MIN: CPT | Performed by: OBSTETRICS & GYNECOLOGY

## 2019-12-09 NOTE — PROGRESS NOTES
Urine protein-neg glucose-neg  CC:  Pregnancy    HPI- Pt is 24 y.o.  at 29w1d here for prenatal visit.  Patient has no complaints.  She reports good fetal movement.    ROS-     - No vaginal bleeding    GI- No abdominal pain    /68   Wt 60.3 kg (133 lb)   LMP 2019 (Exact Date)   BMI 23.57 kg/m²   Exam - See flow sheet    Fetal heart rate is normal    Assessment-  Diagnoses and all orders for this visit:    Encounter for supervision of other normal pregnancy in third trimester    Patient is doing well.  Discussed growth ultrasound in 2 weeks due to previous .  She will also follow-up with me in 2 weeks.    Counseling was given to patient for the following topics: instructions for management and patient and family education . Total time of the encounter was 15 minutes and 10 minutes was spend counseling.

## 2019-12-23 ENCOUNTER — PROCEDURE VISIT (OUTPATIENT)
Dept: OBSTETRICS AND GYNECOLOGY | Facility: CLINIC | Age: 24
End: 2019-12-23

## 2019-12-23 ENCOUNTER — ROUTINE PRENATAL (OUTPATIENT)
Dept: OBSTETRICS AND GYNECOLOGY | Facility: CLINIC | Age: 24
End: 2019-12-23

## 2019-12-23 VITALS — BODY MASS INDEX: 23.92 KG/M2 | SYSTOLIC BLOOD PRESSURE: 109 MMHG | DIASTOLIC BLOOD PRESSURE: 71 MMHG | WEIGHT: 135 LBS

## 2019-12-23 DIAGNOSIS — Z3A.31 31 WEEKS GESTATION OF PREGNANCY: ICD-10-CM

## 2019-12-23 DIAGNOSIS — Z98.891 PREVIOUS CESAREAN SECTION: Primary | ICD-10-CM

## 2019-12-23 DIAGNOSIS — Z36.89 ENCOUNTER FOR ULTRASOUND TO CHECK FETAL GROWTH: ICD-10-CM

## 2019-12-23 LAB
GLUCOSE UR STRIP-MCNC: NEGATIVE MG/DL
PROT UR STRIP-MCNC: NEGATIVE MG/DL

## 2019-12-23 PROCEDURE — 90471 IMMUNIZATION ADMIN: CPT | Performed by: OBSTETRICS & GYNECOLOGY

## 2019-12-23 PROCEDURE — 76816 OB US FOLLOW-UP PER FETUS: CPT | Performed by: OBSTETRICS & GYNECOLOGY

## 2019-12-23 PROCEDURE — 90715 TDAP VACCINE 7 YRS/> IM: CPT | Performed by: OBSTETRICS & GYNECOLOGY

## 2019-12-23 PROCEDURE — 99213 OFFICE O/P EST LOW 20 MIN: CPT | Performed by: OBSTETRICS & GYNECOLOGY

## 2019-12-23 NOTE — PROGRESS NOTES
Chief Complaint   Patient presents with   • Routine Prenatal Visit     HPI- Pt is 24 y.o.  at 31w1d here for prenatal visit.  Patient has no complaints.  She reports adequate fetal movement.    ROS-     - No vaginal bleeding    GI- No abdominal pain    /71   Wt 61.2 kg (135 lb)   LMP 2019 (Exact Date)   BMI 23.92 kg/m²   Exam - See flow sheet    Fetal heart rate is normal    Assessment-  Diagnoses and all orders for this visit:    Previous  section  -     Case Request    31 weeks gestation of pregnancy    Other orders  -     POC Urinalysis Dipstick    Growth ultrasound was performed today and shows appropriate fetal growth.  Discussed with patient trial of labor versus repeat .  She desires repeat  and we will schedule.  Patient request Tdap today.  She will follow-up with me in 2 weeks.    Counseling was given to patient for the following topics: diagnostic results, instructions for management and patient and family education . Total time of the encounter was 15 minutes and 10 minutes was spend counseling.

## 2019-12-24 ENCOUNTER — TELEPHONE (OUTPATIENT)
Dept: OBSTETRICS AND GYNECOLOGY | Facility: CLINIC | Age: 24
End: 2019-12-24

## 2019-12-24 RX ORDER — PROMETHAZINE HYDROCHLORIDE 25 MG/1
25 TABLET ORAL EVERY 6 HOURS PRN
Qty: 30 TABLET | Refills: 0 | Status: SHIPPED | OUTPATIENT
Start: 2019-12-24 | End: 2020-01-27 | Stop reason: SDUPTHER

## 2020-01-06 ENCOUNTER — ROUTINE PRENATAL (OUTPATIENT)
Dept: OBSTETRICS AND GYNECOLOGY | Facility: CLINIC | Age: 25
End: 2020-01-06

## 2020-01-06 VITALS — SYSTOLIC BLOOD PRESSURE: 112 MMHG | DIASTOLIC BLOOD PRESSURE: 72 MMHG | WEIGHT: 141 LBS | BODY MASS INDEX: 24.98 KG/M2

## 2020-01-06 DIAGNOSIS — Z98.891 PREVIOUS CESAREAN SECTION: Primary | ICD-10-CM

## 2020-01-06 DIAGNOSIS — Z3A.33 33 WEEKS GESTATION OF PREGNANCY: ICD-10-CM

## 2020-01-06 LAB
GLUCOSE UR STRIP-MCNC: NEGATIVE MG/DL
PROT UR STRIP-MCNC: NEGATIVE MG/DL

## 2020-01-06 PROCEDURE — 99213 OFFICE O/P EST LOW 20 MIN: CPT | Performed by: OBSTETRICS & GYNECOLOGY

## 2020-01-06 NOTE — PROGRESS NOTES
Chief Complaint   Patient presents with   • Routine Prenatal Visit     HPI- Pt is 24 y.o.  at 33w1d here for prenatal visit.  Patient complains of acid reflux.  She has not tried any over-the-counter medications.  She reports adequate fetal movement.    ROS-     - No vaginal bleeding    GI- No abdominal pain    /72   Wt 64 kg (141 lb)   LMP 2019 (Exact Date)   BMI 24.98 kg/m²   Exam - See flow sheet    Fetal heart rate is normal    Assessment-  Diagnoses and all orders for this visit:    Previous  section    33 weeks gestation of pregnancy    Other orders  -     POC Urinalysis Dipstick      Discussed over-the-counter medication that she may try for her acid reflux.  Discussed lifestyle modifications to help with symptoms.  Patient is scheduled for repeat .  She will follow-up with me in 2 weeks.    Counseling was given to patient for the following topics: instructions for management, risk factor reductions and patient and family education . Total time of the encounter was 15 minutes and 10 minutes was spend counseling.

## 2020-01-07 PROBLEM — Z98.891 PREVIOUS CESAREAN SECTION: Status: ACTIVE | Noted: 2020-01-07

## 2020-01-17 ENCOUNTER — TELEPHONE (OUTPATIENT)
Dept: OBSTETRICS AND GYNECOLOGY | Facility: CLINIC | Age: 25
End: 2020-01-17

## 2020-01-17 NOTE — TELEPHONE ENCOUNTER
If patient is feeling her baby move and pain is only mild soreness, I think she is safe to continue to observe. If she has any abdominal pain that is more than just soreness, decreased fetal movement, or vaginal bleeding, she should be evaluated on labor and delivery.
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
## 2020-01-20 ENCOUNTER — ROUTINE PRENATAL (OUTPATIENT)
Dept: OBSTETRICS AND GYNECOLOGY | Facility: CLINIC | Age: 25
End: 2020-01-20

## 2020-01-20 VITALS — WEIGHT: 144 LBS | DIASTOLIC BLOOD PRESSURE: 79 MMHG | SYSTOLIC BLOOD PRESSURE: 119 MMHG | BODY MASS INDEX: 25.51 KG/M2

## 2020-01-20 DIAGNOSIS — Z98.891 PREVIOUS CESAREAN SECTION: Primary | ICD-10-CM

## 2020-01-20 DIAGNOSIS — Z3A.35 35 WEEKS GESTATION OF PREGNANCY: ICD-10-CM

## 2020-01-20 LAB
GLUCOSE UR STRIP-MCNC: NEGATIVE MG/DL
PROT UR STRIP-MCNC: ABNORMAL MG/DL

## 2020-01-20 PROCEDURE — 99213 OFFICE O/P EST LOW 20 MIN: CPT | Performed by: OBSTETRICS & GYNECOLOGY

## 2020-01-20 NOTE — PROGRESS NOTES
Chief Complaint   Patient presents with   • Routine Prenatal Visit     HPI- Pt is 25 y.o.  at 35w1d here for prenatal visit.  Patient complains of pelvic pressure and difficulty getting comfortable at night.  She reports adequate fetal movement.  She denies any contractions or leaking of fluid.    ROS-     - No vaginal bleeding    GI- No abdominal pain    /79   Wt 65.3 kg (144 lb)   LMP 2019 (Exact Date)   BMI 25.51 kg/m²   Exam - See flow sheet    Fetal heart rate is normal    Assessment-  Diagnoses and all orders for this visit:    Previous  section    35 weeks gestation of pregnancy  -     Group B Streptococcus Culture - Swab, Vaginal/Rectum    Other orders  -     POC Urinalysis Dipstick    GBS culture was obtained.  Patient is scheduled for repeat  in 4 weeks.  Labor precautions were reviewed.  She will follow-up weekly.    Counseling was given to patient for the following topics: instructions for management and patient and family education . Total time of the encounter was 15 minutes and 10 minutes was spend counseling.

## 2020-01-24 LAB — B-HEM STREP SPEC QL CULT: NEGATIVE

## 2020-01-27 ENCOUNTER — ROUTINE PRENATAL (OUTPATIENT)
Dept: OBSTETRICS AND GYNECOLOGY | Facility: CLINIC | Age: 25
End: 2020-01-27

## 2020-01-27 VITALS — SYSTOLIC BLOOD PRESSURE: 96 MMHG | DIASTOLIC BLOOD PRESSURE: 69 MMHG | WEIGHT: 144 LBS | BODY MASS INDEX: 25.51 KG/M2

## 2020-01-27 DIAGNOSIS — Z3A.36 36 WEEKS GESTATION OF PREGNANCY: ICD-10-CM

## 2020-01-27 DIAGNOSIS — Z98.891 PREVIOUS CESAREAN SECTION: Primary | ICD-10-CM

## 2020-01-27 LAB
GLUCOSE UR STRIP-MCNC: NEGATIVE MG/DL
PROT UR STRIP-MCNC: ABNORMAL MG/DL

## 2020-01-27 PROCEDURE — 99213 OFFICE O/P EST LOW 20 MIN: CPT | Performed by: OBSTETRICS & GYNECOLOGY

## 2020-01-27 RX ORDER — PROMETHAZINE HYDROCHLORIDE 25 MG/1
25 TABLET ORAL EVERY 6 HOURS PRN
Qty: 30 TABLET | Refills: 0 | Status: SHIPPED | OUTPATIENT
Start: 2020-01-27 | End: 2020-04-10

## 2020-01-27 NOTE — PROGRESS NOTES
Chief Complaint   Patient presents with   • Routine Prenatal Visit     HPI- Pt is 25 y.o.  at 36w1d here for prenatal visit.  Patient has no major complaints.  She reports adequate fetal movement.    ROS-     - No vaginal bleeding    GI- No abdominal pain    BP 96/69   Wt 65.3 kg (144 lb)   LMP 2019 (Exact Date)   BMI 25.51 kg/m²   Exam - See flow sheet    Fetal heart rate is normal    Assessment-  Diagnoses and all orders for this visit:    Previous  section    36 weeks gestation of pregnancy    Other orders  -     POC Urinalysis Dipstick  -     promethazine (PHENERGAN) 25 MG tablet; Take 1 tablet by mouth Every 6 (Six) Hours As Needed for Nausea or Vomiting.    Reviewed negative GBS culture.  Labor precautions were reviewed.  She is scheduled for repeat  in 3 weeks.  Patient will follow-up next week for OB visit.    Counseling was given to patient for the following topics: instructions for management and patient and family education . Total time of the encounter was 15 minutes and 10 minutes was spend counseling.

## 2020-02-03 ENCOUNTER — ROUTINE PRENATAL (OUTPATIENT)
Dept: OBSTETRICS AND GYNECOLOGY | Facility: CLINIC | Age: 25
End: 2020-02-03

## 2020-02-03 VITALS — SYSTOLIC BLOOD PRESSURE: 111 MMHG | BODY MASS INDEX: 25.87 KG/M2 | DIASTOLIC BLOOD PRESSURE: 73 MMHG | WEIGHT: 146 LBS

## 2020-02-03 DIAGNOSIS — Z98.891 PREVIOUS CESAREAN SECTION: Primary | ICD-10-CM

## 2020-02-03 DIAGNOSIS — Z3A.37 37 WEEKS GESTATION OF PREGNANCY: ICD-10-CM

## 2020-02-03 LAB
GLUCOSE UR STRIP-MCNC: NEGATIVE MG/DL
PROT UR STRIP-MCNC: ABNORMAL MG/DL

## 2020-02-03 PROCEDURE — 99213 OFFICE O/P EST LOW 20 MIN: CPT | Performed by: OBSTETRICS & GYNECOLOGY

## 2020-02-03 NOTE — PROGRESS NOTES
Chief Complaint   Patient presents with   • Routine Prenatal Visit     HPI- Pt is 25 y.o.  at 37w1d here for prenatal visit.  Patient has no complaints.  She reports adequate fetal movement.  She denies contractions, leaking, or vaginal bleeding.    ROS-     - No vaginal bleeding    GI- No abdominal pain    /73   Wt 66.2 kg (146 lb)   LMP 2019 (Exact Date)   BMI 25.87 kg/m²   Exam - See flow sheet    Fetal heart rate is normal    Assessment-  Diagnoses and all orders for this visit:    Previous  section    37 weeks gestation of pregnancy    Other orders  -     POC Urinalysis Dipstick    Patient continues to do well.  Patient is scheduled for repeat  in 2 weeks.  She will continue with weekly follow-ups.

## 2020-02-10 ENCOUNTER — ROUTINE PRENATAL (OUTPATIENT)
Dept: OBSTETRICS AND GYNECOLOGY | Facility: CLINIC | Age: 25
End: 2020-02-10

## 2020-02-10 VITALS — BODY MASS INDEX: 26.37 KG/M2 | SYSTOLIC BLOOD PRESSURE: 115 MMHG | WEIGHT: 148.8 LBS | DIASTOLIC BLOOD PRESSURE: 77 MMHG

## 2020-02-10 DIAGNOSIS — Z3A.38 38 WEEKS GESTATION OF PREGNANCY: ICD-10-CM

## 2020-02-10 DIAGNOSIS — Z98.891 PREVIOUS CESAREAN SECTION: Primary | ICD-10-CM

## 2020-02-10 LAB
GLUCOSE UR STRIP-MCNC: NEGATIVE MG/DL
PROT UR STRIP-MCNC: NEGATIVE MG/DL

## 2020-02-10 PROCEDURE — 99213 OFFICE O/P EST LOW 20 MIN: CPT | Performed by: OBSTETRICS & GYNECOLOGY

## 2020-02-10 NOTE — PROGRESS NOTES
Chief Complaint   Patient presents with   • Routine Prenatal Visit     HPI- Pt is 25 y.o.  at 38w1d here for prenatal visit.  Patient has no complaints.  She reports good fetal movement.    ROS-     - No vaginal bleeding    GI- No abdominal pain    /77   Wt 67.5 kg (148 lb 12.8 oz)   LMP 2019 (Exact Date)   BMI 26.37 kg/m²   Exam - See flow sheet    Fetal heart rate is normal    Assessment-  Diagnoses and all orders for this visit:    Previous  section    38 weeks gestation of pregnancy    Other orders  -     POC Urinalysis Dipstick    Patient is scheduled for repeat  in 1 week.  Preoperative instructions discussed.

## 2020-02-18 ENCOUNTER — ANESTHESIA (OUTPATIENT)
Dept: LABOR AND DELIVERY | Facility: HOSPITAL | Age: 25
End: 2020-02-18

## 2020-02-18 ENCOUNTER — HOSPITAL ENCOUNTER (INPATIENT)
Facility: HOSPITAL | Age: 25
LOS: 2 days | Discharge: HOME | End: 2020-02-20
Attending: OBSTETRICS & GYNECOLOGY | Admitting: OBSTETRICS & GYNECOLOGY

## 2020-02-18 ENCOUNTER — ANESTHESIA EVENT (OUTPATIENT)
Dept: LABOR AND DELIVERY | Facility: HOSPITAL | Age: 25
End: 2020-02-18

## 2020-02-18 DIAGNOSIS — Z98.891 S/P CESAREAN SECTION: Primary | ICD-10-CM

## 2020-02-18 PROBLEM — Z34.90 PREGNANCY: Status: ACTIVE | Noted: 2020-02-18

## 2020-02-18 LAB
ABO GROUP BLD: NORMAL
BLD GP AB SCN SERPL QL: NEGATIVE
DEPRECATED RDW RBC AUTO: 40.9 FL (ref 37–54)
ERYTHROCYTE [DISTWIDTH] IN BLOOD BY AUTOMATED COUNT: 12.6 % (ref 12.3–15.4)
HCT VFR BLD AUTO: 40.6 % (ref 34–46.6)
HGB BLD-MCNC: 14 G/DL (ref 12–15.9)
MCH RBC QN AUTO: 30.8 PG (ref 26.6–33)
MCHC RBC AUTO-ENTMCNC: 34.5 G/DL (ref 31.5–35.7)
MCV RBC AUTO: 89.4 FL (ref 79–97)
PLATELET # BLD AUTO: 129 10*3/MM3 (ref 140–450)
PMV BLD AUTO: 12.4 FL (ref 6–12)
RBC # BLD AUTO: 4.54 10*6/MM3 (ref 3.77–5.28)
RH BLD: POSITIVE
T&S EXPIRATION DATE: NORMAL
WBC NRBC COR # BLD: 10.27 10*3/MM3 (ref 3.4–10.8)

## 2020-02-18 PROCEDURE — 25010000002 PHENYLEPHRINE PER 1 ML: Performed by: NURSE ANESTHETIST, CERTIFIED REGISTERED

## 2020-02-18 PROCEDURE — 25010000002 MORPHINE PER 10 MG: Performed by: ANESTHESIOLOGY

## 2020-02-18 PROCEDURE — 25010000002 ONDANSETRON PER 1 MG: Performed by: OBSTETRICS & GYNECOLOGY

## 2020-02-18 PROCEDURE — 59515 CESAREAN DELIVERY: CPT | Performed by: OBSTETRICS & GYNECOLOGY

## 2020-02-18 PROCEDURE — 25010000002 ONDANSETRON PER 1 MG: Performed by: ANESTHESIOLOGY

## 2020-02-18 PROCEDURE — 86900 BLOOD TYPING SEROLOGIC ABO: CPT | Performed by: OBSTETRICS & GYNECOLOGY

## 2020-02-18 PROCEDURE — 25010000002 HYDROMORPHONE PER 4 MG: Performed by: ANESTHESIOLOGY

## 2020-02-18 PROCEDURE — 86850 RBC ANTIBODY SCREEN: CPT | Performed by: OBSTETRICS & GYNECOLOGY

## 2020-02-18 PROCEDURE — 63710000001 DIPHENHYDRAMINE PER 50 MG: Performed by: OBSTETRICS & GYNECOLOGY

## 2020-02-18 PROCEDURE — 25010000002 CEFAZOLIN PER 500 MG: Performed by: OBSTETRICS & GYNECOLOGY

## 2020-02-18 PROCEDURE — 85027 COMPLETE CBC AUTOMATED: CPT | Performed by: OBSTETRICS & GYNECOLOGY

## 2020-02-18 PROCEDURE — 86901 BLOOD TYPING SEROLOGIC RH(D): CPT | Performed by: OBSTETRICS & GYNECOLOGY

## 2020-02-18 RX ORDER — MISOPROSTOL 200 UG/1
800 TABLET ORAL AS NEEDED
Status: DISCONTINUED | OUTPATIENT
Start: 2020-02-18 | End: 2020-02-18 | Stop reason: HOSPADM

## 2020-02-18 RX ORDER — CALCIUM CARBONATE 200(500)MG
1 TABLET,CHEWABLE ORAL EVERY 6 HOURS PRN
Status: DISCONTINUED | OUTPATIENT
Start: 2020-02-18 | End: 2020-02-20 | Stop reason: HOSPADM

## 2020-02-18 RX ORDER — FAMOTIDINE 10 MG/ML
20 INJECTION, SOLUTION INTRAVENOUS ONCE AS NEEDED
Status: COMPLETED | OUTPATIENT
Start: 2020-02-18 | End: 2020-02-18

## 2020-02-18 RX ORDER — OXYTOCIN-SODIUM CHLORIDE 0.9% IV SOLN 30 UNIT/500ML 30-0.9/5 UT/ML-%
999 SOLUTION INTRAVENOUS ONCE
Status: COMPLETED | OUTPATIENT
Start: 2020-02-18 | End: 2020-02-18

## 2020-02-18 RX ORDER — IBUPROFEN 800 MG/1
800 TABLET ORAL EVERY 8 HOURS PRN
Status: DISCONTINUED | OUTPATIENT
Start: 2020-02-18 | End: 2020-02-20 | Stop reason: HOSPADM

## 2020-02-18 RX ORDER — LIDOCAINE HYDROCHLORIDE 10 MG/ML
5 INJECTION, SOLUTION EPIDURAL; INFILTRATION; INTRACAUDAL; PERINEURAL AS NEEDED
Status: DISCONTINUED | OUTPATIENT
Start: 2020-02-18 | End: 2020-02-18 | Stop reason: HOSPADM

## 2020-02-18 RX ORDER — PROMETHAZINE HYDROCHLORIDE 25 MG/ML
12.5 INJECTION, SOLUTION INTRAMUSCULAR; INTRAVENOUS EVERY 4 HOURS PRN
Status: DISCONTINUED | OUTPATIENT
Start: 2020-02-18 | End: 2020-02-20 | Stop reason: HOSPADM

## 2020-02-18 RX ORDER — PRENATAL VIT NO.126/IRON/FOLIC 28MG-0.8MG
1 TABLET ORAL DAILY
Status: DISCONTINUED | OUTPATIENT
Start: 2020-02-18 | End: 2020-02-20 | Stop reason: HOSPADM

## 2020-02-18 RX ORDER — CEFAZOLIN SODIUM 2 G/100ML
2 INJECTION, SOLUTION INTRAVENOUS ONCE
Status: COMPLETED | OUTPATIENT
Start: 2020-02-18 | End: 2020-02-18

## 2020-02-18 RX ORDER — MORPHINE SULFATE 1 MG/ML
INJECTION, SOLUTION EPIDURAL; INTRATHECAL; INTRAVENOUS
Status: COMPLETED | OUTPATIENT
Start: 2020-02-18 | End: 2020-02-18

## 2020-02-18 RX ORDER — OXYTOCIN-SODIUM CHLORIDE 0.9% IV SOLN 30 UNIT/500ML 30-0.9/5 UT/ML-%
125 SOLUTION INTRAVENOUS CONTINUOUS PRN
Status: COMPLETED | OUTPATIENT
Start: 2020-02-18 | End: 2020-02-18

## 2020-02-18 RX ORDER — ONDANSETRON 2 MG/ML
4 INJECTION INTRAMUSCULAR; INTRAVENOUS ONCE AS NEEDED
Status: COMPLETED | OUTPATIENT
Start: 2020-02-18 | End: 2020-02-18

## 2020-02-18 RX ORDER — ONDANSETRON 2 MG/ML
4 INJECTION INTRAMUSCULAR; INTRAVENOUS EVERY 6 HOURS PRN
Status: DISCONTINUED | OUTPATIENT
Start: 2020-02-18 | End: 2020-02-20 | Stop reason: HOSPADM

## 2020-02-18 RX ORDER — ACETAMINOPHEN 500 MG
1000 TABLET ORAL ONCE
Status: COMPLETED | OUTPATIENT
Start: 2020-02-18 | End: 2020-02-18

## 2020-02-18 RX ORDER — DIPHENHYDRAMINE HCL 25 MG
25 CAPSULE ORAL EVERY 4 HOURS PRN
Status: DISCONTINUED | OUTPATIENT
Start: 2020-02-18 | End: 2020-02-20 | Stop reason: HOSPADM

## 2020-02-18 RX ORDER — DIPHENHYDRAMINE HYDROCHLORIDE 50 MG/ML
25 INJECTION INTRAMUSCULAR; INTRAVENOUS EVERY 4 HOURS PRN
Status: DISCONTINUED | OUTPATIENT
Start: 2020-02-18 | End: 2020-02-20 | Stop reason: HOSPADM

## 2020-02-18 RX ORDER — MORPHINE SULFATE 2 MG/ML
2 INJECTION, SOLUTION INTRAMUSCULAR; INTRAVENOUS
Status: ACTIVE | OUTPATIENT
Start: 2020-02-18 | End: 2020-02-19

## 2020-02-18 RX ORDER — SODIUM CHLORIDE 0.9 % (FLUSH) 0.9 %
1-10 SYRINGE (ML) INJECTION AS NEEDED
Status: DISCONTINUED | OUTPATIENT
Start: 2020-02-18 | End: 2020-02-18 | Stop reason: HOSPADM

## 2020-02-18 RX ORDER — CARBOPROST TROMETHAMINE 250 UG/ML
250 INJECTION, SOLUTION INTRAMUSCULAR AS NEEDED
Status: DISCONTINUED | OUTPATIENT
Start: 2020-02-18 | End: 2020-02-18 | Stop reason: HOSPADM

## 2020-02-18 RX ORDER — DOCUSATE SODIUM 100 MG/1
100 CAPSULE, LIQUID FILLED ORAL 2 TIMES DAILY PRN
Status: DISCONTINUED | OUTPATIENT
Start: 2020-02-18 | End: 2020-02-20 | Stop reason: HOSPADM

## 2020-02-18 RX ORDER — OXYTOCIN-SODIUM CHLORIDE 0.9% IV SOLN 30 UNIT/500ML 30-0.9/5 UT/ML-%
250 SOLUTION INTRAVENOUS CONTINUOUS
Status: DISPENSED | OUTPATIENT
Start: 2020-02-18 | End: 2020-02-18

## 2020-02-18 RX ORDER — ERYTHROMYCIN 5 MG/G
OINTMENT OPHTHALMIC
Status: DISPENSED
Start: 2020-02-18 | End: 2020-02-18

## 2020-02-18 RX ORDER — PHYTONADIONE 1 MG/.5ML
INJECTION, EMULSION INTRAMUSCULAR; INTRAVENOUS; SUBCUTANEOUS
Status: DISPENSED
Start: 2020-02-18 | End: 2020-02-18

## 2020-02-18 RX ORDER — METHYLERGONOVINE MALEATE 0.2 MG/ML
200 INJECTION INTRAVENOUS ONCE AS NEEDED
Status: DISCONTINUED | OUTPATIENT
Start: 2020-02-18 | End: 2020-02-18 | Stop reason: HOSPADM

## 2020-02-18 RX ORDER — PROMETHAZINE HYDROCHLORIDE 12.5 MG/1
12.5 TABLET ORAL EVERY 4 HOURS PRN
Status: DISCONTINUED | OUTPATIENT
Start: 2020-02-18 | End: 2020-02-20 | Stop reason: HOSPADM

## 2020-02-18 RX ORDER — ONDANSETRON 2 MG/ML
4 INJECTION INTRAMUSCULAR; INTRAVENOUS ONCE AS NEEDED
Status: DISCONTINUED | OUTPATIENT
Start: 2020-02-18 | End: 2020-02-20 | Stop reason: HOSPADM

## 2020-02-18 RX ORDER — ONDANSETRON 4 MG/1
4 TABLET, FILM COATED ORAL EVERY 8 HOURS PRN
Status: DISCONTINUED | OUTPATIENT
Start: 2020-02-18 | End: 2020-02-20 | Stop reason: HOSPADM

## 2020-02-18 RX ORDER — HYDROXYZINE 50 MG/1
50 TABLET, FILM COATED ORAL EVERY 6 HOURS PRN
Status: DISCONTINUED | OUTPATIENT
Start: 2020-02-18 | End: 2020-02-20 | Stop reason: HOSPADM

## 2020-02-18 RX ORDER — HYDROMORPHONE HYDROCHLORIDE 1 MG/ML
0.5 INJECTION, SOLUTION INTRAMUSCULAR; INTRAVENOUS; SUBCUTANEOUS
Status: DISCONTINUED | OUTPATIENT
Start: 2020-02-18 | End: 2020-02-18 | Stop reason: HOSPADM

## 2020-02-18 RX ORDER — NALOXONE HCL 0.4 MG/ML
0.2 VIAL (ML) INJECTION
Status: CANCELLED | OUTPATIENT
Start: 2020-02-18

## 2020-02-18 RX ORDER — BUPIVACAINE HYDROCHLORIDE 7.5 MG/ML
INJECTION, SOLUTION EPIDURAL; RETROBULBAR
Status: COMPLETED | OUTPATIENT
Start: 2020-02-18 | End: 2020-02-18

## 2020-02-18 RX ORDER — SODIUM CHLORIDE 0.9 % (FLUSH) 0.9 %
3 SYRINGE (ML) INJECTION EVERY 12 HOURS SCHEDULED
Status: DISCONTINUED | OUTPATIENT
Start: 2020-02-18 | End: 2020-02-18 | Stop reason: HOSPADM

## 2020-02-18 RX ORDER — SODIUM CHLORIDE, SODIUM LACTATE, POTASSIUM CHLORIDE, CALCIUM CHLORIDE 600; 310; 30; 20 MG/100ML; MG/100ML; MG/100ML; MG/100ML
125 INJECTION, SOLUTION INTRAVENOUS CONTINUOUS
Status: DISCONTINUED | OUTPATIENT
Start: 2020-02-18 | End: 2020-02-20 | Stop reason: HOSPADM

## 2020-02-18 RX ORDER — OXYCODONE HYDROCHLORIDE AND ACETAMINOPHEN 5; 325 MG/1; MG/1
1 TABLET ORAL EVERY 4 HOURS PRN
Status: DISCONTINUED | OUTPATIENT
Start: 2020-02-18 | End: 2020-02-20 | Stop reason: HOSPADM

## 2020-02-18 RX ADMIN — PHENYLEPHRINE HYDROCHLORIDE 100 MCG: 10 INJECTION INTRAVENOUS at 07:54

## 2020-02-18 RX ADMIN — IBUPROFEN 800 MG: 800 TABLET, FILM COATED ORAL at 22:54

## 2020-02-18 RX ADMIN — PHENYLEPHRINE HYDROCHLORIDE 100 MCG: 10 INJECTION INTRAVENOUS at 08:02

## 2020-02-18 RX ADMIN — IBUPROFEN 800 MG: 800 TABLET, FILM COATED ORAL at 13:07

## 2020-02-18 RX ADMIN — BUPIVACAINE HYDROCHLORIDE 1.6 ML: 7.5 INJECTION, SOLUTION EPIDURAL; RETROBULBAR at 07:49

## 2020-02-18 RX ADMIN — HYDROMORPHONE HYDROCHLORIDE 0.5 MG: 1 INJECTION, SOLUTION INTRAMUSCULAR; INTRAVENOUS; SUBCUTANEOUS at 10:13

## 2020-02-18 RX ADMIN — DOCUSATE SODIUM 100 MG: 100 CAPSULE, LIQUID FILLED ORAL at 18:48

## 2020-02-18 RX ADMIN — OXYCODONE HYDROCHLORIDE AND ACETAMINOPHEN 1 TABLET: 5; 325 TABLET ORAL at 18:48

## 2020-02-18 RX ADMIN — DIPHENHYDRAMINE HYDROCHLORIDE 25 MG: 25 CAPSULE ORAL at 22:55

## 2020-02-18 RX ADMIN — OXYTOCIN 999 ML/HR: 10 INJECTION INTRAVENOUS at 08:05

## 2020-02-18 RX ADMIN — OXYTOCIN 125 ML/HR: 10 INJECTION, SOLUTION INTRAMUSCULAR; INTRAVENOUS at 09:32

## 2020-02-18 RX ADMIN — PHENYLEPHRINE HYDROCHLORIDE 100 MCG: 10 INJECTION INTRAVENOUS at 07:52

## 2020-02-18 RX ADMIN — HYDROXYZINE HYDROCHLORIDE 50 MG: 50 TABLET ORAL at 12:00

## 2020-02-18 RX ADMIN — SODIUM CHLORIDE, POTASSIUM CHLORIDE, SODIUM LACTATE AND CALCIUM CHLORIDE 125 ML/HR: 600; 310; 30; 20 INJECTION, SOLUTION INTRAVENOUS at 06:58

## 2020-02-18 RX ADMIN — FAMOTIDINE 20 MG: 10 INJECTION INTRAVENOUS at 07:14

## 2020-02-18 RX ADMIN — ACETAMINOPHEN 1000 MG: 500 TABLET, FILM COATED ORAL at 07:13

## 2020-02-18 RX ADMIN — ONDANSETRON 4 MG: 2 INJECTION INTRAMUSCULAR; INTRAVENOUS at 13:07

## 2020-02-18 RX ADMIN — DEXTROSE MONOHYDRATE 2 G: 50 INJECTION, SOLUTION INTRAVENOUS at 07:31

## 2020-02-18 RX ADMIN — OXYCODONE HYDROCHLORIDE AND ACETAMINOPHEN 1 TABLET: 5; 325 TABLET ORAL at 22:54

## 2020-02-18 RX ADMIN — OXYCODONE HYDROCHLORIDE AND ACETAMINOPHEN 1 TABLET: 5; 325 TABLET ORAL at 13:07

## 2020-02-18 RX ADMIN — MORPHINE SULFATE 200 MCG: 1 INJECTION, SOLUTION EPIDURAL; INTRATHECAL; INTRAVENOUS at 07:49

## 2020-02-18 RX ADMIN — ONDANSETRON 4 MG: 2 INJECTION INTRAMUSCULAR; INTRAVENOUS at 07:11

## 2020-02-18 NOTE — H&P
[facility]  Obstetric History and Physical    Chief Complaint   Patient presents with   • Scheduled      Term, repeat. Denies loss of fluid, vaginal bleeding. +FM       Subjective     Patient is a 25 y.o. female  currently at 39w2d, who presents for scheduled repeat c section.    Her prenatal care is benign.  Her previous obstetric/gynecological history is noted for previous  section.    The following portions of the patients history were reviewed and updated as appropriate: current medications, allergies, past medical history, past surgical history, past family history, past social history and problem list .       Prenatal Information:  Prenatal Results     POC Urine Glucose/Protein     Test Value Reference Range Date Time    Urine Glucose Negative  Negative, 1000 mg/dL (3+) 02/10/20     Urine Protein Negative  Negative 02/10/20           Initial Prenatal Labs     Test Value Reference Range Date Time    Hemoglobin 13.0 g/dL 11.1 - 15.9 19 1336    Hematocrit 39.2 % 34.0 - 46.6 19 1336    Platelets 129 10*3/mm3 140 - 450 20 0600      149 10*3/mm3 140 - 450 19 1101      213 x10E3/uL 150 - 450 19 1336    Rubella IgG 6.62 index Immune >0.99 19 1336    Hepatitis B SAg Negative  Negative 19 1336    Hepatitis C Ab 0.2 s/co ratio 0.0 - 0.9 19 1336    RPR Non Reactive  Non Reactive 19 1336    ABO A   20 0600    Rh Positive   20 0600    Antibody Screen Negative  Negative 19 1336    HIV Non Reactive  Non Reactive 19 1336    Urine Culture Final report   19 0209    Gonorrhea Negative  Negative 18 1139    Chlamydia Negative  Negative 18 1139    TSH              2nd and 3rd Trimester     Test Value Reference Range Date Time    Hemoglobin (repeated) 14.0 g/dL 12.0 - 15.9 20 0600      12.7 g/dL 12.0 - 15.9 19 1101    Hematocrit (repeated) 40.6 % 34.0 - 46.6 20 0600      38.9 % 34.0 - 46.6  11/25/19 1101    GCT 86 mg/dL 65 - 179 11/25/19 1101    Antibody Screen (repeated) Negative   02/18/20 0600    GTT Fasting        GTT 1 Hr        GTT 2 Hr        GTT 3 Hr        Group B Strep Negative  Negative 01/20/20 0421          Drug Screening     Test Value Reference Range Date Time    Amphetamine Screen        Barbiturate Screen        Benzodiazepine Screen        Methadone Screen        Phencyclidine Screen        Opiates Screen        THC Screen        Cocaine Screen        Propoxyphene Screen        Buprenorphine Screen        Methamphetamine Screen        Oxycodone Screen        Tricyclic Antidepressants Screen              Other (Risk screening)     Test Value Reference Range Date Time    Varicella IgG        Parvovirus IgG        CMV IgG        Cystic Fibrosis        Hemoglobin electrophoresis        NIPT        MSAFP-4        AFP (for NTD only)                  External Prenatal Results     Pregnancy Outside Results - Transcribed From Office Records - See Scanned Records For Details     Test Value Date Time    Hgb 14.0 g/dL 02/18/20 0600      12.7 g/dL 11/25/19 1101      13.0 g/dL 07/08/19 1336    Hct 40.6 % 02/18/20 0600      38.9 % 11/25/19 1101      39.2 % 07/08/19 1336    ABO A  02/18/20 0600    Rh Positive  02/18/20 0600    Antibody Screen Negative  02/18/20 0600      Negative  07/08/19 1336    Glucose Fasting GTT       Glucose Tolerance Test 1 hour       Glucose Tolerance Test 3 hour       Gonorrhea (discrete) Negative  06/22/18 1139    Chlamydia (discrete) Negative  06/22/18 1139    RPR Non Reactive  07/08/19 1336    VDRL       Syphilis Antibody       Rubella 6.62 index 07/08/19 1336    HBsAg Negative  07/08/19 1336    Herpes Simplex Virus PCR       Herpes Simplex VIrus Culture       HIV Non Reactive  07/08/19 1336    Hep C RNA Quant PCR       Hep C Antibody 0.2 s/co ratio 07/08/19 1336    AFP 72.8 ng/mL 10/23/17 1601    Group B Strep Negative  01/20/20 0421    GBS Susceptibility to Clindamycin        GBS Susceptibility to Erythromycin       Fetal Fibronectin       Genetic Testing, Maternal Blood             Drug Screening     Test Value Date Time    Urine Drug Screen       Amphetamine Screen       Barbiturate Screen       Benzodiazepine Screen       Methadone Screen       Phencyclidine Screen       Opiates Screen       THC Screen       Cocaine Screen       Propoxyphene Screen       Buprenorphine Screen       Methamphetamine Screen       Oxycodone Screen       Tricyclic Antidepressants Screen                    Past OB History:     OB History    Para Term  AB Living   2 1 1 0 0 1   SAB TAB Ectopic Molar Multiple Live Births   0 0 0 0 0 1      # Outcome Date GA Lbr Eduard/2nd Weight Sex Delivery Anes PTL Lv   2 Current            1 Term 20 39w2d  3135 g (6 lb 14.6 oz) M CS-LTranv EPI N JAMILA      Birth Comments: PANDA OR 2      Name: ABDELRAHMAN LOCKETT      Apgar1: 8  Apgar5: 9       Past Medical History: Past Medical History:   Diagnosis Date   • High cholesterol    • Scoliosis       Past Surgical History Past Surgical History:   Procedure Laterality Date   •  SECTION N/A 3/28/2018    Procedure:  SECTION PRIMARY;  Surgeon: Franchesca Tanner MD;  Location: [facility] LABOR DELIVERY;  Service: Obstetrics/Gynecology   • WISDOM TOOTH EXTRACTION        Family History: History reviewed. No pertinent family history.   Social History:  reports that she has never smoked. She has never used smokeless tobacco.   reports that she does not drink alcohol.   reports that she does not use drugs.        General ROS: Pertinent items are noted in HPI, all other systems reviewed and negative    Objective       Vital Signs Range for the last 24 hours  Temperature: Temp:  [98.1 °F (36.7 °C)] 98.1 °F (36.7 °C)   Temp Source: Temp src: Oral   BP: BP: (120)/(72) 120/72   Pulse: Heart Rate:  [83] 83   Respirations: Resp:  [16] 16   SPO2:     O2 Amount (l/min):     O2 Devices     Weight: Weight:  [67.9 kg  (149 lb 9.6 oz)] 67.9 kg (149 lb 9.6 oz)     Physical Examination: General appearance - alert, well appearing, and in no distress  Abdomen - gravid, soft, nontender, EFW 7#  Pelvic - exam deferred  Extremities - peripheral pulses normal, no pedal edema, no clubbing or cyanosis    Presentation: vertex   Cervix: Exam by:     Dilation:     Effacement:     Station:         Fetal Heart Rate Assessment   Method: Fetal HR Assessment Method: external   Beats/min: Fetal HR (beats/min): 35   Baseline: Fetal Heart Baseline Rate: normal range   Variability: Fetal HR Variability: moderate (amplitude range 6 to 25 bpm)   Accels: Fetal HR Accelerations: greater than/equal to 15 bpm, lasting at least 15 seconds   Decels: Fetal HR Decelerations: absent   Tracing Category:       Uterine Assessment   Method: Method: external tocotransducer, per patient report, palpation   Frequency (min):     Ctx Count in 10 min:     Duration:     Intensity: Contraction Intensity: no contractions   Intensity by IUPC:     Resting Tone: Uterine Resting Tone: soft by palpation   Resting Tone by IUPC:     [place] Units:         Assessment/Plan       Previous  section    Pregnancy        Assessment:  1.  Intrauterine pregnancy at 39w2d gestation with reassuring fetal status.    2.  Previous  section  3.  Obstetrical history significant for previous  section.  4.  GBS status:   Strep Gp B Culture   Date Value Ref Range Status   2020 Negative Negative Final     Comment:     Centers for Disease Control and Prevention (CDC) and American Congress  of Obstetricians and Gynecologists (ACOG) guidelines for prevention of   group B streptococcal (GBS) disease specify co-collection of  a vaginal and rectal swab specimen to maximize sensitivity of GBS  detection. Per the CDC and ACOG, swabbing both the lower vagina and  rectum substantially increases the yield of detection compared with  sampling the vagina alone.  Penicillin  G, ampicillin, or cefazolin are indicated for intrapartum  prophylaxis of  GBS colonization. Reflex susceptibility  testing should be performed prior to use of clindamycin only on GBS  isolates from penicillin-allergic women who are considered a high risk  for anaphylaxis. Treatment with vancomycin without additional testing  is warranted if resistance to clindamycin is noted.         Plan:  1. Plan to proceed with repeat  section.  Risks, benefits, and alternatives discussed with patient and she agrees to proceed.  She understands risks of infection, bleeding, damage to surrounding structures, need for additional surgery if injury occurs, risk of anesthesia, blood clots, heart attack, and stroke.  2. Plan of care has been reviewed with patient and .  3.  Risks, benefits of treatment plan have been discussed.  4.  All questions have been answered.      Franchesca Tanner MD  2020

## 2020-02-18 NOTE — ANESTHESIA PROCEDURE NOTES
Spinal Block      Patient reassessed immediately prior to procedure    Patient location during procedure: OB  Performed By  Anesthesiologist: Bishop Dave MD  Preanesthetic Checklist  Completed: patient identified, site marked, surgical consent, pre-op evaluation, timeout performed, IV checked, risks and benefits discussed and monitors and equipment checked  Spinal Block Prep:  Patient Position:sitting  Sterile Tech:cap, gloves, sterile barriers and mask  Prep:Chloraprep  Patient Monitoring:EKG, continuous pulse oximetry and blood pressure monitoring  Spinal Block Procedure  Approach:midline  Guidance:landmark technique  Location:L4-L5  Needle Type:Sprotte  Needle Gauge:24 G  Placement of Spinal needle event:cerebrospinal fluid aspirated and Unable to enter CSF at lowest spot, moved one space higher  Paresthesia: no  Fluid Appearance:clear  Medications: Morphine PF injection, 200 mcg  bupivacaine PF (MARCAINE) 0.75 % injection, 1.6 mL  Med Administered at 2/18/2020 7:49 AM   Post Assessment  Patient Tolerance:patient tolerated the procedure well with no apparent complications  Complications no

## 2020-02-18 NOTE — L&D DELIVERY NOTE
Section Procedure Note    Indications: previous  section low transverse    Pre-operative Diagnosis: 1. 39 week 2 day pregnancy.  2. Previous  section x 1  3. Desires repeat  section    Post-operative Diagnosis: same    Surgeon: Franchesca Tanner MD     Assistants: Salvador Dudley MD    Anesthesia: Spinal anesthesia    Procedure Details   The patient was seen in the Holding Room. The risks, benefits, complications, treatment options, and expected outcomes were discussed with the patient.  The patient concurred with the proposed plan, giving informed consent.  The site of surgery properly noted/marked. The patient was taken to Operating Room # 1, identified as Katt Nunn and the procedure verified as  Delivery. A Time Out was held and the above information confirmed.    After induction of anesthesia, the patient was draped and prepped in the usual sterile manner. A Pfannenstiel incision was made and carried down through the subcutaneous tissue to the fascia. Fascial incision was made and extended transversely. The fascia was  from the underlying rectus tissue superiorly and inferiorly. The peritoneum was identified and entered. Peritoneal incision was extended longitudinally. The utero-vesical peritoneal reflection was incised transversely and the bladder flap was bluntly freed from the lower uterine segment. A low transverse uterine incision was made. Delivered from vertex presentation was a 2970 gram female with Apgar scores of 8 at one minute and 9 at five minutes. After the umbilical cord was clamped and cut cord blood was obtained for evaluation. The placenta was removed intact and appeared normal.  Uterus was exteriorized.  The uterine outline, tubes and ovaries appeared normal. The uterine incision was closed with running locked sutures of 0 Vicryl.  A second imbricating layer was placed with 0 Vicryl in a running fashion.  Hemostasis was observed.  Uterus  was returned to the patient's abdomen.  The hysterotomy was revisualized and remained hemostatic.  The peritoneum was reapproximated with 3-0 Vicryl in a running fashion.  The fascia was then reapproximated with running sutures of 0 Vicryl.  The subcutaneous layer was reapproximated with 3-0 Vicryl in a running fashion.  The skin was reapproximated with 4-0 Monocryl and Dermabond.    Instrument, sponge, and needle counts were correct prior the abdominal closure and at the conclusion of the case.     Findings:  Normal pelvic anatomy    Estimated Blood Loss:  323 mL           Drains: Amado, draining clear urine           Specimens: placenta, discarded           Implants: None           Complications:  None; patient tolerated the procedure well.           Disposition: PACU - hemodynamically stable.           Condition: stable    Attending Attestation: I was present and scrubbed for the entire procedure.

## 2020-02-18 NOTE — LACTATION NOTE
P2. Patient  in L&D for the first time.  offered to share information but patient was not receptive. Her intention is to formula feed.

## 2020-02-18 NOTE — ANESTHESIA PREPROCEDURE EVALUATION
Anesthesia Evaluation     Patient summary reviewed and Nursing notes reviewed   no history of anesthetic complications:  NPO Solid Status: > 8 hours  NPO Liquid Status: > 8 hours           Airway   Mallampati: I  TM distance: >3 FB  Neck ROM: full  no difficulty expected  Dental - normal exam     Pulmonary - negative pulmonary ROS and normal exam   Cardiovascular - normal exam  Exercise tolerance: good (4-7 METS)    Rhythm: regular  Rate: normal    (+) hyperlipidemia,       Neuro/Psych- negative ROS  GI/Hepatic/Renal/Endo      Musculoskeletal (-) negative ROS    Abdominal  - normal exam   Substance History - negative use     OB/GYN    (+) Pregnant,     Comment: 39wk. 2 days      Other - negative ROS       ROS/Med Hx Other: Hx scoliosis                  Anesthesia Plan    ASA 2     spinal       Anesthetic plan, all risks, benefits, and alternatives have been provided, discussed and informed consent has been obtained with: patient and spouse/significant other.

## 2020-02-18 NOTE — ANESTHESIA POSTPROCEDURE EVALUATION
"Patient: Katt Nunn    Procedure Summary     Date:  20 Room / Location:   [facility]    Anesthesia Start:  735 Anesthesia Stop:  842    Procedure:   SECTION REPEAT (N/A Abdomen) Diagnosis:       Previous  section      (Previous  section [Z98.891])    Surgeon:  Franchesca Tanner MD Provider:  Bishop Dave MD    Anesthesia Type:  spinal ASA Status:  2          Anesthesia Type: spinal    Vitals  Vitals Value Taken Time   /60 2020 10:30 AM   Temp 37.1 °C (98.7 °F) 2020 10:30 AM   Pulse 74 2020 10:37 AM   Resp 18 2020 10:30 AM   SpO2 98 % 2020 10:37 AM   Vitals shown include unvalidated device data.        Post Anesthesia Care and Evaluation    Patient location during evaluation: PACU  Patient participation: complete - patient participated  Level of consciousness: awake and alert  Pain management: adequate  Airway patency: patent  Anesthetic complications: No anesthetic complications    Cardiovascular status: acceptable  Respiratory status: acceptable  Hydration status: acceptable  Post Neuraxial Block status: Motor and sensory function returned to baseline and No signs or symptoms of PDPH  Comments: /60   Pulse 75   Temp 37.1 °C (98.7 °F) (Oral)   Resp 18   Ht 160 cm (63\")   Wt 67.9 kg (149 lb 9.6 oz)   LMP 2019 (Exact Date)   SpO2 98%   Breastfeeding Unknown   BMI 26.50 kg/m²         "

## 2020-02-19 LAB
BASOPHILS # BLD AUTO: 0.03 10*3/MM3 (ref 0–0.2)
BASOPHILS NFR BLD AUTO: 0.3 % (ref 0–1.5)
DEPRECATED RDW RBC AUTO: 41.3 FL (ref 37–54)
EOSINOPHIL # BLD AUTO: 0.1 10*3/MM3 (ref 0–0.4)
EOSINOPHIL NFR BLD AUTO: 0.9 % (ref 0.3–6.2)
ERYTHROCYTE [DISTWIDTH] IN BLOOD BY AUTOMATED COUNT: 12.3 % (ref 12.3–15.4)
HCT VFR BLD AUTO: 36.1 % (ref 34–46.6)
HGB BLD-MCNC: 11.9 G/DL (ref 12–15.9)
IMM GRANULOCYTES # BLD AUTO: 0.05 10*3/MM3 (ref 0–0.05)
IMM GRANULOCYTES NFR BLD AUTO: 0.5 % (ref 0–0.5)
LYMPHOCYTES # BLD AUTO: 2.72 10*3/MM3 (ref 0.7–3.1)
LYMPHOCYTES NFR BLD AUTO: 25 % (ref 19.6–45.3)
MCH RBC QN AUTO: 30.4 PG (ref 26.6–33)
MCHC RBC AUTO-ENTMCNC: 33 G/DL (ref 31.5–35.7)
MCV RBC AUTO: 92.3 FL (ref 79–97)
MONOCYTES # BLD AUTO: 0.83 10*3/MM3 (ref 0.1–0.9)
MONOCYTES NFR BLD AUTO: 7.6 % (ref 5–12)
NEUTROPHILS # BLD AUTO: 7.17 10*3/MM3 (ref 1.7–7)
NEUTROPHILS NFR BLD AUTO: 65.7 % (ref 42.7–76)
NRBC BLD AUTO-RTO: 0 /100 WBC (ref 0–0.2)
PLATELET # BLD AUTO: 114 10*3/MM3 (ref 140–450)
PMV BLD AUTO: 12.6 FL (ref 6–12)
RBC # BLD AUTO: 3.91 10*6/MM3 (ref 3.77–5.28)
WBC NRBC COR # BLD: 10.9 10*3/MM3 (ref 3.4–10.8)

## 2020-02-19 PROCEDURE — 99024 POSTOP FOLLOW-UP VISIT: CPT | Performed by: OBSTETRICS & GYNECOLOGY

## 2020-02-19 PROCEDURE — 63710000001 DIPHENHYDRAMINE PER 50 MG: Performed by: OBSTETRICS & GYNECOLOGY

## 2020-02-19 PROCEDURE — 85025 COMPLETE CBC W/AUTO DIFF WBC: CPT | Performed by: OBSTETRICS & GYNECOLOGY

## 2020-02-19 RX ORDER — SIMETHICONE 80 MG
80 TABLET,CHEWABLE ORAL 4 TIMES DAILY PRN
Status: DISCONTINUED | OUTPATIENT
Start: 2020-02-19 | End: 2020-02-20 | Stop reason: HOSPADM

## 2020-02-19 RX ADMIN — OXYCODONE HYDROCHLORIDE AND ACETAMINOPHEN 1 TABLET: 5; 325 TABLET ORAL at 02:15

## 2020-02-19 RX ADMIN — OXYCODONE HYDROCHLORIDE AND ACETAMINOPHEN 1 TABLET: 5; 325 TABLET ORAL at 10:45

## 2020-02-19 RX ADMIN — OXYCODONE HYDROCHLORIDE AND ACETAMINOPHEN 1 TABLET: 5; 325 TABLET ORAL at 06:37

## 2020-02-19 RX ADMIN — DOCUSATE SODIUM 100 MG: 100 CAPSULE, LIQUID FILLED ORAL at 20:29

## 2020-02-19 RX ADMIN — DOCUSATE SODIUM 100 MG: 100 CAPSULE, LIQUID FILLED ORAL at 09:30

## 2020-02-19 RX ADMIN — DIPHENHYDRAMINE HYDROCHLORIDE 25 MG: 25 CAPSULE ORAL at 03:45

## 2020-02-19 RX ADMIN — IBUPROFEN 800 MG: 800 TABLET, FILM COATED ORAL at 14:43

## 2020-02-19 RX ADMIN — SIMETHICONE CHEW TAB 80 MG 80 MG: 80 TABLET ORAL at 22:50

## 2020-02-19 RX ADMIN — IBUPROFEN 800 MG: 800 TABLET, FILM COATED ORAL at 22:50

## 2020-02-19 RX ADMIN — OXYCODONE HYDROCHLORIDE AND ACETAMINOPHEN 1 TABLET: 5; 325 TABLET ORAL at 14:44

## 2020-02-19 RX ADMIN — IBUPROFEN 800 MG: 800 TABLET, FILM COATED ORAL at 06:37

## 2020-02-19 RX ADMIN — OXYCODONE HYDROCHLORIDE AND ACETAMINOPHEN 1 TABLET: 5; 325 TABLET ORAL at 20:29

## 2020-02-19 RX ADMIN — SIMETHICONE CHEW TAB 80 MG 80 MG: 80 TABLET ORAL at 10:53

## 2020-02-19 RX ADMIN — Medication 1 TABLET: at 09:30

## 2020-02-19 RX ADMIN — SIMETHICONE CHEW TAB 80 MG 80 MG: 80 TABLET ORAL at 17:23

## 2020-02-19 NOTE — PLAN OF CARE
Problem: Patient Care Overview  Goal: Plan of Care Review  Outcome: Ongoing (interventions implemented as appropriate)  Flowsheets (Taken 2020 1000)  Progress: improving  Outcome Summary: Repeat  without complications, stable in recovery and transferred to MB unit  Goal: Individualization and Mutuality  Outcome: Ongoing (interventions implemented as appropriate)  Flowsheets (Taken 2020 1000)  Patient Specific Goals (Include Timeframe): healthy mom and baby  How to Address Anxieties/Fears: Support and education  Patient Specific Interventions: Support with Yesy Gibson for BRYANT in OR, reminder for dropping drape, help breastfeeding in recovery  What Information Would Help Us Give You More Personalized Care?: Wants to formula feed but wants baby to get colostrum so would like to also breastfeed for the first day or two  How Would You and/or Your Support Person Like to Participate in Your Care?: Fob to be at bedside as much as possible  What Anxieties, Fears, Concerns, or Questions Do You Have About Your Care?: Anxious about spinal  Patient Specific Preferences: BRYANT in the OR, drop clear drop, breastfeeding in recovery  Goal: Discharge Needs Assessment  Outcome: Ongoing (interventions implemented as appropriate)  Flowsheets  Taken 2020 1936 by Merlyn Callejas, RN  Equipment Needed After Discharge: none  Anticipated Changes Related to Illness: none  Transportation Concerns: car, none  Concerns to be Addressed: no discharge needs identified  Readmission Within the Last 30 Days: no previous admission in last 30 days  Taken 2020 0613 by Masood Arora, RN  Equipment Currently Used at Home: none  Taken 2020 0606 by Masood Arora, RN  Transportation Anticipated: car, drives self  Patient/Family Anticipated Services at Transition: none  Patient/Family Anticipates Transition to: home with family  Goal: Interprofessional Rounds/Family Conf  Outcome: Ongoing (interventions implemented as  appropriate)     Problem:  Delivery (Adult,Obstetrics,Pediatric)  Goal: Signs and Symptoms of Listed Potential Problems Will be Absent, Minimized or Managed ( Delivery)  Outcome: Ongoing (interventions implemented as appropriate)  Flowsheets (Taken 2020)  Problems Assessed ( Delivery): all  Problems Present ( Delivery): none     Problem: Fall Risk,  (Adult,Obstetrics,Pediatric)  Goal: Identify Related Risk Factors and Signs and Symptoms  Outcome: Ongoing (interventions implemented as appropriate)  Flowsheets (Taken 2020)  Related Risk Factors (Fall Risk, ): medication side effects; prolonged bed rest; regional anesthesia  Signs and Symptoms (Fall Risk, ): presence of fall risk factors; increased risk of  fall/drop  Goal: Absence of Maternal Fall  Outcome: Ongoing (interventions implemented as appropriate)  Flowsheets (Taken 2020)  Absence of Maternal Fall: making progress toward outcome  Goal: Absence of [place] Fall/Drop  Outcome: Ongoing (interventions implemented as appropriate)  Flowsheets (Taken 2020)  Absence of [place] Fall/Drop: making progress toward outcome

## 2020-02-19 NOTE — PROGRESS NOTES
DAILY PROGRESS NOTE    Patient Identification:  Name: Katt Nunn  Age: 25 y.o.  Sex: female  :  1995  MRN: [id number]               Subjective:  Interval History: Postoperative day #1 from a repeat  delivery.  Pain is controlled with pain medication.  Patient does have some new onset gas pain.  Lochia is minimal.    Objective:    Scheduled Meds:  prenatal (CLASSIC) vitamin 1 tablet Oral Daily     Continuous Infusions:  lactated ringers 125 mL/hr Last Rate: 125 mL/hr (20)     PRN Meds:•  all purpose nipple ointment  •  calcium carbonate  •  diphenhydrAMINE **OR** diphenhydrAMINE **OR** diphenhydrAMINE  •  diphenhydrAMINE  •  docusate sodium  •  hydrocortisone  •  hydrOXYzine  •  ibuprofen  •  Morphine  •  ondansetron  •  ondansetron  •  ondansetron  •  oxyCODONE-acetaminophen  •  promethazine  •  promethazine    Vital signs in last 24 hours:  Temp:  [97.3 °F (36.3 °C)-98.8 °F (37.1 °C)] 98.3 °F (36.8 °C)  Heart Rate:  [] 80  Resp:  [16-18] 16  BP: ()/(62-81) 107/75    Intake/Output:    Intake/Output Summary (Last 24 hours) at 2020 1033  Last data filed at 2020 0732  Gross per 24 hour   Intake 1558 ml   Output 3425 ml   Net -1867 ml       Exam:  General Appearance:    Alert, cooperative, no distress, appears stated age   Lungs:     Clear to auscultation bilaterally, respirations unlabored    Heart:    Regular rate and rhythm, S1 and S2 normal, no murmur, rub   or gallop   Abdomen:    Softly distended.  The fundus is firm and below the umbilicus.  Pfannenstiel incision is well approximated.  Erythema and induration are absent.   Extremities:  Equal in size with minimal pedal edema   Skin:   Skin color, texture, turgor normal, no rashes or lesions        Data Review:    Lab Results (last 24 hours)     Procedure Component Value Units Date/Time    CBC & Differential [[id number]] Collected:  20    Specimen:  Blood Updated:  20    Narrative:        The following orders were created for panel order CBC & Differential.  Procedure                               Abnormality         Status                     ---------                               -----------         ------                     CBC Auto Differential[[id number]]        Abnormal            Final result                 Please view results for these tests on the individual orders.    CBC Auto Differential [[id number]]  (Abnormal) Collected:  20 0545    Specimen:  Blood Updated:  20 0658     WBC 10.90 10*3/mm3      RBC 3.91 10*6/mm3      Hemoglobin 11.9 g/dL      Hematocrit 36.1 %      MCV 92.3 fL      MCH 30.4 pg      MCHC 33.0 g/dL      RDW 12.3 %      RDW-SD 41.3 fl      MPV 12.6 fL      Platelets 114 10*3/mm3      Neutrophil % 65.7 %      Lymphocyte % 25.0 %      Monocyte % 7.6 %      Eosinophil % 0.9 %      Basophil % 0.3 %      Immature Grans % 0.5 %      Neutrophils, Absolute 7.17 10*3/mm3      Lymphocytes, Absolute 2.72 10*3/mm3      Monocytes, Absolute 0.83 10*3/mm3      Eosinophils, Absolute 0.10 10*3/mm3      Basophils, Absolute 0.03 10*3/mm3      Immature Grans, Absolute 0.05 10*3/mm3      nRBC 0.0 /100 WBC         Assessment:    Previous  section    Pregnancy    1.  Appropriate progress, postoperative day #1  2.  Postoperative gas.  The patient has not passed flatus.  She will increase her activity.  We will try Mylicon as well.        Britton Henderson MD  2020

## 2020-02-19 NOTE — PLAN OF CARE
Problem: Patient Care Overview  Goal: Plan of Care Review  Outcome: Ongoing (interventions implemented as appropriate)  Flowsheets  Taken 2020 0434  Progress: improving  Outcome Summary: Repeat  0n ; Lochia light, fundus firm and WNL; Incision open to air and clean, dry and intact; Closed with adhesive bandage and sutures; Pain controlled with PO meds; Pt complaining of alot of itching; PO Bendryl given which relieves overall itching per pt; Bottlefeeding but wants to attempt to give infant colostrum first couple of days; Up ab josé miguel multiple times; Voided x1; Good output; S/O at bedside; POC discussed with pt and FOB; Questions and concerns addressed at this time; Will contunie to monitor and assess  Taken 2020 0420  Plan of Care Reviewed With: patient;spouse  Goal: Individualization and Mutuality  Outcome: Ongoing (interventions implemented as appropriate)  Flowsheets  Taken 2020 1000 by Merlyn Callejas, RN  What Information Would Help Us Give You More Personalized Care?: Wants to formula feed but wants baby to get colostrum so would like to also breastfeed for the first day or two  Taken 2020 0434 by Keily Salazar, RN  How Would You and/or Your Support Person Like to Participate in Your Care?: FOB to be at bedside as much as possible; Invovle patient and FOB in daily POC  Patient Specific Preferences: Bottlefeeding; FOB at bedside and as invovled as possible  Goal: Discharge Needs Assessment  Outcome: Ongoing (interventions implemented as appropriate)  Flowsheets  Taken 2020 1936 by Merlyn Callejas, RN  Transportation Concerns: car, none  Concerns to be Addressed: no discharge needs identified  Readmission Within the Last 30 Days: no previous admission in last 30 days  Taken 2020 0606 by Masood Arora, RN  Patient/Family Anticipated Services at Transition: none  Patient/Family Anticipates Transition to: home with family  Goal: Interprofessional Rounds/Family  Conf  Outcome: Ongoing (interventions implemented as appropriate)  Flowsheets (Taken 2020 0434)  Participants: family; patient; nursing     Problem:  Delivery (Adult,Obstetrics,Pediatric)  Goal: Signs and Symptoms of Listed Potential Problems Will be Absent, Minimized or Managed ( Delivery)  Outcome: Ongoing (interventions implemented as appropriate)  Flowsheets (Taken 2020 by Merlyn Callejas RN)  Problems Assessed ( Delivery): all  Problems Present ( Delivery): none     Problem: Fall Risk,  (Adult,Obstetrics,Pediatric)  Goal: Identify Related Risk Factors and Signs and Symptoms  Outcome: Ongoing (interventions implemented as appropriate)  Flowsheets  Taken 2020 0434 by Keily Salazar RN  Related Risk Factors (Fall Risk, ): balance change;fatigue;medication side effects;sleep disturbance;prolonged bed rest;regional anesthesia  Taken 2020 by Merlyn Callejas RN  Signs and Symptoms (Fall Risk, ): presence of fall risk factors;increased risk of  fall/drop  Goal: Absence of Maternal Fall  Outcome: Ongoing (interventions implemented as appropriate)  Flowsheets (Taken 2020 0434)  Absence of Maternal Fall: making progress toward outcome  Goal: Absence of [place] Fall/Drop  Outcome: Ongoing (interventions implemented as appropriate)  Flowsheets (Taken 2020 0434)  Absence of [place] Fall/Drop: making progress toward outcome

## 2020-02-20 VITALS
RESPIRATION RATE: 16 BRPM | HEART RATE: 78 BPM | DIASTOLIC BLOOD PRESSURE: 73 MMHG | BODY MASS INDEX: 26.51 KG/M2 | SYSTOLIC BLOOD PRESSURE: 115 MMHG | HEIGHT: 63 IN | WEIGHT: 149.6 LBS | OXYGEN SATURATION: 96 % | TEMPERATURE: 97.6 F

## 2020-02-20 PROBLEM — Z98.891 S/P CESAREAN SECTION: Status: ACTIVE | Noted: 2020-02-20

## 2020-02-20 PROCEDURE — 99024 POSTOP FOLLOW-UP VISIT: CPT | Performed by: OBSTETRICS & GYNECOLOGY

## 2020-02-20 RX ORDER — OXYCODONE HYDROCHLORIDE AND ACETAMINOPHEN 5; 325 MG/1; MG/1
1-2 TABLET ORAL EVERY 4 HOURS PRN
Qty: 30 TABLET | Refills: 0 | Status: SHIPPED | OUTPATIENT
Start: 2020-02-20 | End: 2020-02-24 | Stop reason: SDUPTHER

## 2020-02-20 RX ORDER — IBUPROFEN 800 MG/1
800 TABLET ORAL EVERY 8 HOURS PRN
Qty: 30 TABLET | Refills: 0 | Status: SHIPPED | OUTPATIENT
Start: 2020-02-20 | End: 2022-04-19

## 2020-02-20 RX ADMIN — IBUPROFEN 800 MG: 800 TABLET, FILM COATED ORAL at 08:02

## 2020-02-20 RX ADMIN — OXYCODONE HYDROCHLORIDE AND ACETAMINOPHEN 1 TABLET: 5; 325 TABLET ORAL at 05:36

## 2020-02-20 RX ADMIN — Medication 1 TABLET: at 08:02

## 2020-02-20 RX ADMIN — OXYCODONE HYDROCHLORIDE AND ACETAMINOPHEN 1 TABLET: 5; 325 TABLET ORAL at 13:32

## 2020-02-20 RX ADMIN — DOCUSATE SODIUM 100 MG: 100 CAPSULE, LIQUID FILLED ORAL at 08:02

## 2020-02-20 RX ADMIN — SIMETHICONE CHEW TAB 80 MG 80 MG: 80 TABLET ORAL at 08:02

## 2020-02-20 RX ADMIN — OXYCODONE HYDROCHLORIDE AND ACETAMINOPHEN 1 TABLET: 5; 325 TABLET ORAL at 09:36

## 2020-02-20 RX ADMIN — OXYCODONE HYDROCHLORIDE AND ACETAMINOPHEN 1 TABLET: 5; 325 TABLET ORAL at 00:29

## 2020-02-20 NOTE — DISCHARGE SUMMARY
Date of Discharge:  2020    Discharge Diagnosis: Term IUP with previous C/S- delivered    Presenting Problem/History of Present Illness  Active Hospital Problems    Diagnosis  POA   • **S/P  section [Z98.891]  Not Applicable   • Pregnancy [Z34.90]  Not Applicable   • Previous  section [Z98.891]  Yes      Resolved Hospital Problems   No resolved problems to display.          Hospital Course  Patient is a 25 y.o. female presented with previous C/S at 39 weeks.  RC/S delivered a VFI 6-.  Post-op course entirely uneventful.    Procedures Performed    Procedure(s):   SECTION REPEAT  -------------------       Consults:   Consults     Date and Time Order Name Status Description    2020 0524 Inpatient Anesthesiology Consult            Pertinent Test Results: A pos    Condition on Discharge:  stable    Vital Signs  Temp:  [97.6 °F (36.4 °C)-98.1 °F (36.7 °C)] 97.6 °F (36.4 °C)  Heart Rate:  [73-90] 78  Resp:  [16-18] 16  BP: (106-115)/(66-78) 115/73    Physical Exam:   Abd soft, NT.  Incision healing well.  Ext- no swelling or tenderness    Discharge Disposition  Home or Self Care    Discharge Medications     Discharge Medications      New Medications      Instructions Start Date   ibuprofen 800 MG tablet  Commonly known as:  ADVIL,MOTRIN   800 mg, Oral, Every 8 Hours PRN      oxyCODONE-acetaminophen 5-325 MG per tablet  Commonly known as:  PERCOCET   1-2 tablets, Oral, Every 4 Hours PRN         Continue These Medications      Instructions Start Date   PRENATAL VITAMIN PLUS LOW IRON 27-1 MG tablet   1 tablet, Oral, Daily      promethazine 25 MG tablet  Commonly known as:  PHENERGAN   25 mg, Oral, Every 6 Hours PRN             Discharge Diet:   Diet Instructions     Diet: Regular      Discharge Diet:  Regular          Activity at Discharge:   Activity Instructions     Activity as Tolerated      Pelvic Rest            Follow-up Appointments  No future appointments.  Additional  Instructions for the Follow-ups that You Need to Schedule     Discharge Follow-up with Specified Provider: Dr Tanner; 2 Weeks   As directed      To:  Dr Tanner    Follow Up:  2 Weeks               Test Results Pending at Discharge       Jeffery Kenney MD  02/20/20  1:09 PM    Time: Discharge 25 min

## 2020-02-20 NOTE — PLAN OF CARE
Problem: Patient Care Overview  Goal: Plan of Care Review  Outcome: Outcome(s) achieved  Flowsheets (Taken 2/20/2020 1101)  Progress: improving  Plan of Care Reviewed With: patient  Outcome Summary: VSS, bleeding WNL, vding, up ad josé miguel, mod pain relief with motrin and percocet, baby bottling well,bonding observed

## 2020-02-24 ENCOUNTER — TELEPHONE (OUTPATIENT)
Dept: OBSTETRICS AND GYNECOLOGY | Facility: CLINIC | Age: 25
End: 2020-02-24

## 2020-02-24 DIAGNOSIS — Z98.891 S/P CESAREAN SECTION: ICD-10-CM

## 2020-02-24 RX ORDER — OXYCODONE HYDROCHLORIDE AND ACETAMINOPHEN 5; 325 MG/1; MG/1
1-2 TABLET ORAL EVERY 4 HOURS PRN
Qty: 10 TABLET | Refills: 0 | Status: SHIPPED | OUTPATIENT
Start: 2020-02-24 | End: 2020-03-03

## 2020-02-24 RX ORDER — OXYCODONE HYDROCHLORIDE AND ACETAMINOPHEN 5; 325 MG/1; MG/1
1-2 TABLET ORAL EVERY 4 HOURS PRN
Qty: 10 TABLET | Refills: 0 | Status: SHIPPED | OUTPATIENT
Start: 2020-02-24 | End: 2020-02-24 | Stop reason: SDUPTHER

## 2020-02-24 NOTE — TELEPHONE ENCOUNTER
Some pain can be normal, even after a  section.  If she is having pain uncontrolled with medications, heavy bleeding, or fevers, she needs to be seen

## 2020-02-24 NOTE — TELEPHONE ENCOUNTER
Pt states medication does help the vaginal pain.  She has one day worth of her percocet rx left.  She is requesting a refill for a couple more days.  States getting around is a little more difficult this time, since she has a toddler and the baby to take care of.  Please advise.

## 2020-02-24 NOTE — TELEPHONE ENCOUNTER
PO pt called to schedule 2 wk PO appt for 3/9/2020.  She is reporting vaginal pain.  States she did not experience that with her last pregnancy, so not sure if that is normal.  Please advise.     Pt # [phone]

## 2020-03-09 ENCOUNTER — OFFICE VISIT (OUTPATIENT)
Dept: OBSTETRICS AND GYNECOLOGY | Facility: CLINIC | Age: 25
End: 2020-03-09

## 2020-03-09 VITALS
WEIGHT: 134.6 LBS | SYSTOLIC BLOOD PRESSURE: 122 MMHG | BODY MASS INDEX: 23.84 KG/M2 | DIASTOLIC BLOOD PRESSURE: 90 MMHG | HEART RATE: 71 BPM

## 2020-03-09 DIAGNOSIS — Z48.89 ENCOUNTER FOR POSTOPERATIVE WOUND CHECK: Primary | ICD-10-CM

## 2020-03-09 PROCEDURE — 0503F POSTPARTUM CARE VISIT: CPT | Performed by: OBSTETRICS & GYNECOLOGY

## 2020-03-09 NOTE — PROGRESS NOTES
Subjective   Katt Nunn is a 25 y.o. female here for incision check     History of Present Illness   Patient is 3 weeks status post repeat  at term.  She reports continued pain around her incision, but states it is mainly after increased activity and she does admit that she has had to lift her 3-year-old several times.  She denies any fevers or chills.  She denies any redness around the incision.  She has bottlefeeding.  She does have continued lochia and states it is light.  She denies any concerns for postpartum depression and/or anxiety.    The following portions of the patient's history were reviewed and updated as appropriate: allergies, current medications, past family history, past medical history, past social history, past surgical history and problem list.    Review of Systems   Constitutional: Negative for chills and fever.   Gastrointestinal: Negative for abdominal pain and constipation.   All other systems reviewed and are negative.      Objective   Physical Exam   Constitutional: She appears well-developed and well-nourished.   Cardiovascular: Normal rate and regular rhythm.   Pulmonary/Chest: Effort normal and breath sounds normal.   Abdominal: Soft. She exhibits no distension. There is no tenderness. There is no rebound and no guarding.   Incision intact with no redness, drainage, or tenderness     Neurological: She is alert.   Psychiatric: She has a normal mood and affect.   Vitals reviewed.        Assessment/Plan   Katt was seen today for postpartum care.    Diagnoses and all orders for this visit:    Encounter for postoperative wound check      Incision is healing well.  Discussed continued postoperative wound care.  Patient will continue with ibuprofen as needed for pain.  She will follow-up in 3 weeks for postpartum physical.         Answers for HPI/ROS submitted by the patient on 3/5/2020   What is the primary reason for your visit?: Other  Please describe your symptoms.:  Post C section  Have you had these symptoms before?: Yes  How long have you been having these symptoms?: 1-4 weeks ago  Please list any medications you are currently taking for this condition.: Ibuprofen  Please describe any probable cause for these symptoms. : C section

## 2020-04-10 ENCOUNTER — POSTPARTUM VISIT (OUTPATIENT)
Dept: OBSTETRICS AND GYNECOLOGY | Facility: CLINIC | Age: 25
End: 2020-04-10

## 2020-04-10 VITALS — DIASTOLIC BLOOD PRESSURE: 78 MMHG | SYSTOLIC BLOOD PRESSURE: 122 MMHG | WEIGHT: 139.4 LBS | BODY MASS INDEX: 24.69 KG/M2

## 2020-04-10 DIAGNOSIS — Z30.011 ENCOUNTER FOR INITIAL PRESCRIPTION OF CONTRACEPTIVE PILLS: ICD-10-CM

## 2020-04-10 DIAGNOSIS — F41.8 POSTPARTUM ANXIETY: ICD-10-CM

## 2020-04-10 PROCEDURE — 0503F POSTPARTUM CARE VISIT: CPT | Performed by: OBSTETRICS & GYNECOLOGY

## 2020-04-10 RX ORDER — SERTRALINE HYDROCHLORIDE 25 MG/1
25 TABLET, FILM COATED ORAL DAILY
Qty: 30 TABLET | Refills: 6 | Status: SHIPPED | OUTPATIENT
Start: 2020-04-10 | End: 2020-07-28 | Stop reason: SDUPTHER

## 2020-04-10 RX ORDER — NORETHINDRONE ACETATE AND ETHINYL ESTRADIOL 1; .02 MG/1; MG/1
1 TABLET ORAL DAILY
Qty: 21 TABLET | Refills: 12 | Status: SHIPPED | OUTPATIENT
Start: 2020-04-10 | End: 2021-03-15

## 2020-04-10 NOTE — PROGRESS NOTES
Subjective   Katt Nunn is a 25 y.o. female here for 6 week pp exam.    History of Present Illness   Patient is a 25-year-old who is 6 weeks status post repeat  at term.  Patient does report concerns for postpartum anxiety.  She states that she feels she is worrying more than usual and does feel that some of it is due to being at home due to current Covid 19 pandemic.  She denies any thoughts of harm to herself or others.  Patient reports she is sleeping well.  Patient had postpartum blues with her last delivery and does feel that it lasted longer than it should have.  She did not take any medications or do counseling.  Patient states she has been sexually active, but used condoms.  She has not had a period.  She is bottlefeeding.    The following portions of the patient's history were reviewed and updated as appropriate: allergies, current medications, past family history, past medical history, past social history, past surgical history and problem list.    Review of Systems   Constitutional: Negative for chills and fever.   Gastrointestinal: Negative for abdominal pain and constipation.   Genitourinary: Negative for menstrual problem and pelvic pain.   Psychiatric/Behavioral: Negative for self-injury, suicidal ideas and depressed mood. The patient is nervous/anxious.    All other systems reviewed and are negative.      Objective   Physical Exam   Constitutional: She appears well-developed and well-nourished.   Cardiovascular: Normal rate and regular rhythm.   Pulmonary/Chest: Effort normal and breath sounds normal. Right breast exhibits no inverted nipple, no mass, no nipple discharge, no skin change and no tenderness. Left breast exhibits no inverted nipple, no mass, no nipple discharge, no skin change and no tenderness.   Abdominal: Soft. She exhibits no distension. There is no tenderness.   Incision has healed well   Genitourinary: Vagina normal, uterus normal and cervix normal. There is no  rash, tenderness, lesion or injury on the right labia. There is no rash, tenderness, lesion or injury on the left labia. Right adnexum displays no mass, no tenderness and no fullness. Left adnexum displays no mass, no tenderness and no fullness.   Neurological: She is alert.   Psychiatric: She has a normal mood and affect.   Vitals reviewed.        Assessment/Plan   Katt was seen today for postpartum care.    Diagnoses and all orders for this visit:    Routine postpartum follow-up    Postpartum anxiety  -     sertraline (Zoloft) 25 MG tablet; Take 1 tablet by mouth Daily.    Encounter for initial prescription of contraceptive pills  -     norethindrone-ethinyl estradiol (Loestrin 1/20, 21,) 1-20 MG-MCG per tablet; Take 1 tablet by mouth Daily.      Patient may resume normal activity with no restrictions.  Discussed management options for postpartum anxiety including medication and/or counseling.  Patient would like to start medication and a prescription was sent for Zoloft.  Side effects were discussed with her and explained that it takes 2 to 6 weeks to see an improvement.  Patient also wants to start an oral contraceptive pill for birth control.  Prescription was sent for Loestrin.  She was advised to follow-up in 2 months.

## 2020-07-28 ENCOUNTER — TELEPHONE (OUTPATIENT)
Dept: OBSTETRICS AND GYNECOLOGY | Facility: CLINIC | Age: 25
End: 2020-07-28

## 2020-07-28 DIAGNOSIS — F41.8 POSTPARTUM ANXIETY: ICD-10-CM

## 2020-07-28 NOTE — TELEPHONE ENCOUNTER
I have sent a prescription for 50 mg of sertraline to her pharmacy.  I would encourage her to schedule a follow-up visit with me.  We typically recommend that patients follow-up 2 months after starting treatment.  Sertraline typically does not cause easy bruising, so I would recommend that she follow-up with her primary care provider in regards to that.

## 2020-07-28 NOTE — TELEPHONE ENCOUNTER
Patient called and said that she has been on sertaline since April and she feels that it has been helping very little to none. She wanted to know if there was a higher dosage or alternative she could try. She also states that she has been getting large bruises out of nowhere for the past month. She said they are large and popping up with out her doing anything she is not sure what could be causing this or if it may be from sertaline?

## 2020-10-10 DIAGNOSIS — F41.8 POSTPARTUM ANXIETY: ICD-10-CM

## 2020-12-15 DIAGNOSIS — F41.8 POSTPARTUM ANXIETY: ICD-10-CM

## 2021-03-13 DIAGNOSIS — Z30.011 ENCOUNTER FOR INITIAL PRESCRIPTION OF CONTRACEPTIVE PILLS: ICD-10-CM

## 2021-03-15 RX ORDER — NORETHINDRONE ACETATE AND ETHINYL ESTRADIOL 1; 20 MG/1; UG/1
TABLET ORAL
Qty: 21 TABLET | Refills: 0 | Status: SHIPPED | OUTPATIENT
Start: 2021-03-15 | End: 2021-05-10 | Stop reason: SDUPTHER

## 2021-03-29 ENCOUNTER — TELEPHONE (OUTPATIENT)
Dept: OBSTETRICS AND GYNECOLOGY | Facility: CLINIC | Age: 26
End: 2021-03-29

## 2021-03-29 NOTE — TELEPHONE ENCOUNTER
Hello,    This is one of Dr. Tanner's patients. She is calling to see if she can start taking 25 mg of sertraline. Currently she is taking 50 mg and stated that she would like to stop taking this medication within the next couple of months. I don't know if this is something you would like to handle or wait until Dr. Tanner returns. Please advise.    Many thanks,  Laurel

## 2021-04-16 ENCOUNTER — BULK ORDERING (OUTPATIENT)
Dept: CASE MANAGEMENT | Facility: OTHER | Age: 26
End: 2021-04-16

## 2021-04-16 DIAGNOSIS — Z23 IMMUNIZATION DUE: ICD-10-CM

## 2021-04-19 DIAGNOSIS — F41.8 POSTPARTUM ANXIETY: ICD-10-CM

## 2021-04-29 DIAGNOSIS — Z30.011 ENCOUNTER FOR INITIAL PRESCRIPTION OF CONTRACEPTIVE PILLS: ICD-10-CM

## 2021-04-29 RX ORDER — NORETHINDRONE ACETATE AND ETHINYL ESTRADIOL 1; 20 MG/1; UG/1
TABLET ORAL
Qty: 21 TABLET | Refills: 0 | OUTPATIENT
Start: 2021-04-29

## 2021-05-08 DIAGNOSIS — Z30.011 ENCOUNTER FOR INITIAL PRESCRIPTION OF CONTRACEPTIVE PILLS: ICD-10-CM

## 2021-05-10 ENCOUNTER — TELEPHONE (OUTPATIENT)
Dept: OBSTETRICS AND GYNECOLOGY | Facility: CLINIC | Age: 26
End: 2021-05-10

## 2021-05-10 DIAGNOSIS — Z30.011 ENCOUNTER FOR INITIAL PRESCRIPTION OF CONTRACEPTIVE PILLS: ICD-10-CM

## 2021-05-10 RX ORDER — NORETHINDRONE ACETATE AND ETHINYL ESTRADIOL 1; .02 MG/1; MG/1
1 TABLET ORAL DAILY
Qty: 84 TABLET | Refills: 0 | Status: SHIPPED | OUTPATIENT
Start: 2021-05-10 | End: 2021-07-26

## 2021-05-10 RX ORDER — NORETHINDRONE ACETATE AND ETHINYL ESTRADIOL 1; 20 MG/1; UG/1
TABLET ORAL
Qty: 21 TABLET | Refills: 0 | OUTPATIENT
Start: 2021-05-10

## 2021-05-10 NOTE — TELEPHONE ENCOUNTER
Hello,    Patient requesting refill for Junel 1/20 1-20 MG-MCG per tablet.     Pharmacy confirmed: [facility]/PH[facility]ACY #[id number] - [address] - [phone] [facility] [phone] FX    Also, she is due for an annual so appt. was made for 8/16.     Thank you,  Laurel

## 2021-07-24 DIAGNOSIS — Z30.011 ENCOUNTER FOR INITIAL PRESCRIPTION OF CONTRACEPTIVE PILLS: ICD-10-CM

## 2021-07-26 RX ORDER — NORETHINDRONE ACETATE AND ETHINYL ESTRADIOL 1; 20 MG/1; UG/1
TABLET ORAL
Qty: 84 TABLET | Refills: 0 | Status: SHIPPED | OUTPATIENT
Start: 2021-07-26 | End: 2021-10-20 | Stop reason: SDUPTHER

## 2021-08-27 ENCOUNTER — TELEPHONE (OUTPATIENT)
Dept: OBSTETRICS AND GYNECOLOGY | Facility: CLINIC | Age: 26
End: 2021-08-27

## 2021-10-12 ENCOUNTER — TELEPHONE (OUTPATIENT)
Dept: OBSTETRICS AND GYNECOLOGY | Facility: CLINIC | Age: 26
End: 2021-10-12

## 2021-10-12 DIAGNOSIS — F41.8 POSTPARTUM ANXIETY: ICD-10-CM

## 2021-10-12 NOTE — TELEPHONE ENCOUNTER
Hello,   Flores patient, would like to start taking zoloft again.  She said she is having a lot of anxiety.  Are you able to send this, or does she need to wait for Dr Tanner to be back?    Thanks, Susie

## 2021-10-19 ENCOUNTER — TELEPHONE (OUTPATIENT)
Dept: OBSTETRICS AND GYNECOLOGY | Facility: CLINIC | Age: 26
End: 2021-10-19

## 2021-10-19 DIAGNOSIS — Z30.011 ENCOUNTER FOR INITIAL PRESCRIPTION OF CONTRACEPTIVE PILLS: ICD-10-CM

## 2021-10-19 NOTE — TELEPHONE ENCOUNTER
Good afternoon,  Patient is calling to get a refill of RX-Junel 1/20 1-20 MG-MCG per tablet  If possible.      Please advise,  Thank you      Pharmacy confirmed [id number] - [place], [place] - [address] [facility] - [phone]  - [phone] FX

## 2021-10-20 RX ORDER — NORETHINDRONE ACETATE AND ETHINYL ESTRADIOL 1; .02 MG/1; MG/1
1 TABLET ORAL DAILY
Qty: 84 TABLET | Refills: 0 | Status: SHIPPED | OUTPATIENT
Start: 2021-10-20 | End: 2022-01-10 | Stop reason: SDUPTHER

## 2021-11-12 DIAGNOSIS — F41.8 POSTPARTUM ANXIETY: ICD-10-CM

## 2021-12-08 ENCOUNTER — TELEPHONE (OUTPATIENT)
Dept: OBSTETRICS AND GYNECOLOGY | Facility: CLINIC | Age: 26
End: 2021-12-08

## 2022-01-10 ENCOUNTER — OFFICE VISIT (OUTPATIENT)
Dept: OBSTETRICS AND GYNECOLOGY | Facility: CLINIC | Age: 27
End: 2022-01-10

## 2022-01-10 VITALS
BODY MASS INDEX: 25.61 KG/M2 | SYSTOLIC BLOOD PRESSURE: 123 MMHG | DIASTOLIC BLOOD PRESSURE: 78 MMHG | HEIGHT: 64 IN | WEIGHT: 150 LBS

## 2022-01-10 DIAGNOSIS — Z30.41 ENCOUNTER FOR SURVEILLANCE OF CONTRACEPTIVE PILLS: ICD-10-CM

## 2022-01-10 DIAGNOSIS — L76.82 PAIN AT SURGICAL INCISION: ICD-10-CM

## 2022-01-10 DIAGNOSIS — Z01.419 WOMEN'S ANNUAL ROUTINE GYNECOLOGICAL EXAMINATION: Primary | ICD-10-CM

## 2022-01-10 DIAGNOSIS — N64.4 BREAST PAIN: ICD-10-CM

## 2022-01-10 PROCEDURE — 99213 OFFICE O/P EST LOW 20 MIN: CPT | Performed by: NURSE PRACTITIONER

## 2022-01-10 PROCEDURE — 99395 PREV VISIT EST AGE 18-39: CPT | Performed by: NURSE PRACTITIONER

## 2022-01-10 RX ORDER — NORETHINDRONE ACETATE AND ETHINYL ESTRADIOL 1; .02 MG/1; MG/1
1 TABLET ORAL DAILY
Qty: 84 TABLET | Refills: 3 | Status: SHIPPED | OUTPATIENT
Start: 2022-01-10 | End: 2022-10-10 | Stop reason: SDUPTHER

## 2022-01-10 NOTE — PROGRESS NOTES
GYN Annual Exam     Chief Complaint   Patient presents with   • Gynecologic Exam     Patient is here for a annual.       HPI    Katt Nunn is a 26 y.o. female who presents for annual well woman exam.  She is sexually active. Periods are regular every 28-30 days, lasting 5 days. Dysmenorrhea:none. Cyclic symptoms include none. No intermenstrual bleeding, spotting, or discharge. Performing SBE:occas.     Reports 2 weeks ago she had pain at c/s scar, this is now resolved. She describes as tender, sharp. No known trauma or injury, no changes in activity. This has never happened before. Pain last approximately 2-3 days. She treated pain with ibuprofen with mild relief of symptoms.      C/o intermittent biatleral breast pain for 1-2 months. She is not drinking heavy amounts of caffeine per day. Denies activity changes or trauma.     She would like to continue MARY use. Denies history of migraine with aura, denies history of DVT, there is no family history of DVT. She is a nonsmoker.    This is my first time meeting Katt Nunn  She is a patient of Dr. Tanner's.    OB History        2    Para   2    Term   2            AB        Living   2       SAB        IAB        Ectopic        Molar        Multiple   0    Live Births   2                LMP- 24, she is ending menses today  Current contraception: OCP (estrogen/progesterone)  Last Pap- 2019 negative  History of abnormal Pap smear: yes - ASCUS 2018  History of STD-denies  Family history of uterine, colon or ovarian cancer: no  Family history of breast cancer: no  Gardasil Vaccine:  compteted    Past Medical History:   Diagnosis Date   • High cholesterol    • Scoliosis        Past Surgical History:   Procedure Laterality Date   •  SECTION N/A 3/28/2018    Procedure:  SECTION PRIMARY;  Surgeon: Franchesca Tanner MD;  Location: [facility] LABOR DELIVERY;  Service: Obstetrics/Gynecology   •  SECTION N/A 2020    Procedure:  " SECTION REPEAT;  Surgeon: Franchesca Tanner MD;  Location: [facility] LABOR DELIVERY;  Service: Obstetrics/Gynecology;  Laterality: N/A;   • WISDOM TOOTH EXTRACTION           Current Outpatient Medications:   •  norethindrone-ethinyl estradiol (Junel ) 1-20 MG-MCG per tablet, Take 1 tablet by mouth Daily., Disp: 84 tablet, Rfl: 3  •  sertraline (ZOLOFT) 50 MG tablet, Take 1 tablet by mouth Daily., Disp: 90 tablet, Rfl: 0  •  ibuprofen (ADVIL,MOTRIN) 800 MG tablet, Take 1 tablet by mouth Every 8 (Eight) Hours As Needed for Mild Pain ., Disp: 30 tablet, Rfl: 0  •  Prenatal Vit-Fe Fumarate-FA (PRENATAL VITAMIN PLUS LOW IRON) 27-1 MG tablet, Take 1 tablet by mouth Daily., Disp: , Rfl: 11    No Known Allergies    Social History     Tobacco Use   • Smoking status: Never Smoker   • Smokeless tobacco: Never Used   Substance Use Topics   • Alcohol use: No   • Drug use: No       History reviewed. No pertinent family history.    Review of Systems   Constitutional: Negative for chills, fatigue and fever.   Gastrointestinal: Negative for abdominal distention, abdominal pain, nausea and vomiting.   Genitourinary: Negative for breast discharge, breast lump, breast pain, dysuria, menstrual problem, pelvic pain, pelvic pressure, vaginal bleeding, vaginal discharge and vaginal pain.   Musculoskeletal: Negative for gait problem.   Skin: Negative for rash.   Neurological: Negative for dizziness and headache.   Psychiatric/Behavioral: Negative for behavioral problems.       /78   Ht 162.6 cm (64\")   Wt 68 kg (150 lb)   LMP 2022 (Approximate)   Breastfeeding No   BMI 25.75 kg/m²     Physical Exam  Constitutional:       Appearance: Normal appearance.   Genitourinary:      Vulva, bladder and urethral meatus normal.      No lesions in the vagina.      Right Labia: No rash, tenderness, lesions, skin changes or Bartholin's cyst.     Left Labia: No tenderness, lesions, skin changes, Bartholin's cyst or rash.     No " labial fusion noted.      No inguinal adenopathy present in the right or left side.     No vaginal discharge, erythema, tenderness, bleeding or ulceration.      No vaginal prolapse present.     No vaginal atrophy present.       Right Adnexa: not tender, not full, not palpable, no mass present and not absent.     Left Adnexa: not tender, not full, not palpable, no mass present and not absent.     No cervical motion tenderness, discharge, friability, lesion, polyp, nabothian cyst or eversion.      Uterus is not enlarged, fixed, tender, irregular or prolapsed.      No uterine mass detected.     No urethral tenderness or mass present.      Pelvic exam was performed with patient in the lithotomy position.   Breasts: Breasts are symmetrical.      Right: Present. No swelling, bleeding, inverted nipple, mass, nipple discharge, skin change, tenderness, breast implant, axillary adenopathy or supraclavicular adenopathy.      Left: Present. No swelling, bleeding, inverted nipple, mass, nipple discharge, skin change, tenderness, breast implant, axillary adenopathy or supraclavicular adenopathy.       HENT:      Head: Normocephalic and atraumatic.   Eyes:      Pupils: Pupils are equal, round, and reactive to light.   Cardiovascular:      Rate and Rhythm: Normal rate.   Pulmonary:      Effort: Pulmonary effort is normal.   Abdominal:      General: There is no distension.      Palpations: Abdomen is soft. There is no mass.      Tenderness: There is no abdominal tenderness. There is no guarding.      Hernia: No hernia is present. There is no hernia in the left inguinal area or right inguinal area.   Musculoskeletal:         General: Normal range of motion.      Cervical back: Normal range of motion and neck supple. No tenderness.   Lymphadenopathy:      Cervical: No cervical adenopathy.      Upper Body:      Right upper body: No supraclavicular, axillary or pectoral adenopathy.      Left upper body: No supraclavicular, axillary or  pectoral adenopathy.      Lower Body: No right inguinal adenopathy. No left inguinal adenopathy.   Neurological:      General: No focal deficit present.      Mental Status: She is alert and oriented to person, place, and time.      Cranial Nerves: No cranial nerve deficit.   Skin:     General: Skin is warm and dry.      Comments:  section is well healed, no edema, no induration, no pain with palpation, no erythema   Psychiatric:         Mood and Affect: Mood normal.         Behavior: Behavior normal.         Thought Content: Thought content normal.         Judgment: Judgment normal.   Vitals and nursing note reviewed.         Assessment   Diagnoses and all orders for this visit:    1. Women's annual routine gynecological examination (Primary)  -     IGP,CtNgTv,rfx Apt HPV All    2. Encounter for surveillance of contraceptive pills  -     norethindrone-ethinyl estradiol (Junel 1/20) 1-20 MG-MCG per tablet; Take 1 tablet by mouth Daily.  Dispense: 84 tablet; Refill: 3    3. Breast pain    4. Pain at surgical incision       Plan   1. Well woman exam: Pap collected Yes. Recommend MVI daily.    2. Contraception: Junel refilled  3. STD: Enc condoms. STD screen today- Yes. Pap   4. Smoking status: nonsmoker  5.  Encouraged annual mammogram screening starting at age 40. Instructed on how to perform SBE. Encouraged breast health self awareness.  6.    Encouraged 150 minutes of exercise per week if not medially contraindicated.   7.    Patient's Body mass index is 25.75 kg/m². indicating that she is overweight by BMI (BMI 25-29.9).   8.    Encouraged to f/u with additional pain at incision. Incision is well healed. Discussed possible causes including ovarian cyst, GI in nature, musculoskeletal in nature, premenstrual pain.  9.   Normal breast exam. Encouraged 400 IU vitamin E once or twice daily, call if no improvement in 1-2 months or sooner with any worsening symptoms. Will consider breast ultrasound if no  improvement.     Follow Up one year or PRN    Mary Miramontes, APRN  1/10/2022  15:34 EST

## 2022-01-12 LAB
C TRACH RRNA CVX QL NAA+PROBE: NEGATIVE
CONV .: NORMAL
CYTOLOGIST CVX/VAG CYTO: NORMAL
CYTOLOGY CVX/VAG DOC CYTO: NORMAL
CYTOLOGY CVX/VAG DOC THIN PREP: NORMAL
DX ICD CODE: NORMAL
HIV 1 & 2 AB SER-IMP: NORMAL
N GONORRHOEA RRNA CVX QL NAA+PROBE: NEGATIVE
OTHER STN SPEC: NORMAL
STAT OF ADQ CVX/VAG CYTO-IMP: NORMAL
T VAGINALIS RRNA SPEC QL NAA+PROBE: NEGATIVE

## 2022-02-12 DIAGNOSIS — F41.8 POSTPARTUM ANXIETY: ICD-10-CM

## 2022-04-12 ENCOUNTER — TELEPHONE (OUTPATIENT)
Dept: OBSTETRICS AND GYNECOLOGY | Facility: CLINIC | Age: 27
End: 2022-04-12

## 2022-04-12 NOTE — TELEPHONE ENCOUNTER
Pt called to request appt for ultrasound.  Reports she is still having pain around her csection scar (inside).  No order, see provider first?    Pt # [phone]   [id number]

## 2022-04-12 NOTE — TELEPHONE ENCOUNTER
Since we discussed this briefly at her last visit. She can be scheduled for an u/s and f/u (same day as u/s) to review results and further evaluate. Sharmila can you please call her for scheduling? Thank you

## 2022-04-13 NOTE — TELEPHONE ENCOUNTER
Called patient to schedule US and appointment with Silke LANDON to schedule at next available. L/V/M.

## 2022-04-19 ENCOUNTER — OFFICE VISIT (OUTPATIENT)
Dept: OBSTETRICS AND GYNECOLOGY | Facility: CLINIC | Age: 27
End: 2022-04-19

## 2022-04-19 VITALS
BODY MASS INDEX: 25.61 KG/M2 | SYSTOLIC BLOOD PRESSURE: 125 MMHG | DIASTOLIC BLOOD PRESSURE: 87 MMHG | HEIGHT: 64 IN | WEIGHT: 150 LBS

## 2022-04-19 DIAGNOSIS — R10.2 PELVIC PAIN: Primary | ICD-10-CM

## 2022-04-19 PROCEDURE — 99213 OFFICE O/P EST LOW 20 MIN: CPT | Performed by: NURSE PRACTITIONER

## 2022-04-19 NOTE — PROGRESS NOTES
Chief Complaint   Patient presents with   • Follow-up     Discuss u/s, pain around  scar, pelvic pain       SUBJECTIVE:     Katt Nunn is a 27 y.o.  who presents with c/o ongoing pain at c/s incision. Last delivery was . Symptoms started in 2022.  Pap smear normal in January with negative chlamydia, gonorrhea, and trichomonas. Pain is intermittent, but becoming more frequent. Pain is worsened by orgasm, also occurring when at rest. Pain is sharp, shooting, can last from minutes to several hours. This has never happened to her before. Today she has noticed this pain is worsened when moving from laying to sitting position. This pain has occurred three times in the last week. Denies new partners. Denies GI issues.     She is a patient of Dr. Tanner's.    Past Medical History:   Diagnosis Date   • High cholesterol    • Scoliosis       Past Surgical History:   Procedure Laterality Date   •  SECTION N/A 3/28/2018    Procedure:  SECTION PRIMARY;  Surgeon: Franchesca Tanner MD;  Location:  [facility]OR DELIVERY;  Service: Obstetrics/Gynecology   •  SECTION N/A 2020    Procedure:  SECTION REPEAT;  Surgeon: Franchesca Tanner MD;  Location: [facility] LABOR DELIVERY;  Service: Obstetrics/Gynecology;  Laterality: N/A;   • WISDOM TOOTH EXTRACTION        Social History     Tobacco Use   • Smoking status: Never Smoker   • Smokeless tobacco: Never Used   Substance Use Topics   • Alcohol use: Yes     Comment: social   • Drug use: No     OB History    Para Term  AB Living   2 2 2     2   SAB IAB Ectopic Molar Multiple Live Births           0 2      # Outcome Date GA Lbr Eduard/2nd Weight Sex Delivery Anes PTL Lv   2 Term 20 39w2d  2970 g (6 lb 8.8 oz) F CS-LTranv Spinal N JAMILA      Birth Comments: panda 1   1 Term 18 39w2d  3135 g (6 lb 14.6 oz) M CS-LTranv EPI N JAMILA      Birth Comments: PANDA OR 2        Review of Systems   Constitutional:  "Negative for chills, fatigue and fever.   Gastrointestinal: Positive for diarrhea (anxiety related). Negative for abdominal distention, abdominal pain, constipation, nausea and vomiting.   Genitourinary: Positive for dyspareunia and pelvic pain. Negative for menstrual problem, vaginal bleeding, vaginal discharge and vaginal pain.   Musculoskeletal: Positive for back pain (hx scoliosis ). Negative for gait problem.   Psychiatric/Behavioral: The patient is nervous/anxious.        OBJECTIVE:   Vitals:    04/19/22 1448   BP: 125/87   Weight: 68 kg (150 lb)   Height: 162.6 cm (64\")        Physical Exam  Constitutional:       General: She is not in acute distress.     Appearance: Normal appearance. She is not ill-appearing, toxic-appearing or diaphoretic.   Genitourinary:      Bladder and urethral meatus normal.      No lesions in the vagina.      Right Labia: No rash, tenderness, lesions, skin changes or Bartholin's cyst.     Left Labia: No tenderness, lesions, skin changes, Bartholin's cyst or rash.     No labial fusion noted.      No inguinal adenopathy present in the right or left side.     No vaginal discharge, erythema, tenderness, bleeding, ulceration or granulation tissue.      No vaginal prolapse present.     No vaginal atrophy present.       Right Adnexa: not tender, not full, not palpable, no mass present and not absent.     Left Adnexa: not tender, not full, not palpable, no mass present and not absent.     No cervical motion tenderness, discharge, friability, lesion, polyp, nabothian cyst or eversion.      Uterus is not enlarged, fixed, tender, irregular or prolapsed.      No uterine mass detected.  Abdominal:      General: There is no distension.      Palpations: Abdomen is soft. There is no mass.      Tenderness: There is no abdominal tenderness. There is no right CVA tenderness, left CVA tenderness, guarding or rebound.      Hernia: No hernia is present. There is no hernia in the left inguinal area or " right inguinal area.   Musculoskeletal:         General: Normal range of motion.   Lymphadenopathy:      Lower Body: No right inguinal adenopathy. No left inguinal adenopathy.   Neurological:      General: No focal deficit present.      Mental Status: She is alert and oriented to person, place, and time.      Cranial Nerves: No cranial nerve deficit.   Skin:     General: Skin is warm.   Vitals and nursing note reviewed.       Assessment/Plan    Diagnoses and all orders for this visit:    1. Pelvic pain (Primary)  -     Urine Culture - Urine, Urine, Clean Catch  -     Ambulatory Referral to Physical Therapy      TVUS completed today with normal uterus, normal ovaries, no free fluid noted  Denies AUB  Recent negative chlamydia, gonorrhea, and trichomonas  Will check urine culture  Discussed possible causes, such as musculoskeletal or GI  Discussed pelvic floor PT, she would like to try this  Recommend f/u with Dr Tanner in 6-8 weeks if no improvement or with worsening symptoms  Advised to go to ED with fevers, chills, N&V, body aches, worsening pain  Will consider GI referral if no improvement with PT     Follow up: 6-8 weeks if no improvement, PRN worsening symptoms     I spent 20 minutes caring for Katt on this date of service. This time includes time spent by me in the following activities: reviewing tests, obtaining and/or reviewing a separately obtained history, performing a medically appropriate examination and/or evaluation, counseling and educating the patient/family/caregiver, ordering medications, tests, or procedures and documenting information in the medical record      Mary Miramontes, KATY  4/19/2022  15:58 EDT

## 2022-04-21 LAB
BACTERIA UR CULT: NORMAL
BACTERIA UR CULT: NORMAL

## 2022-08-10 DIAGNOSIS — F41.8 POSTPARTUM ANXIETY: ICD-10-CM

## 2022-08-16 ENCOUNTER — TELEPHONE (OUTPATIENT)
Dept: OBSTETRICS AND GYNECOLOGY | Facility: CLINIC | Age: 27
End: 2022-08-16

## 2022-08-16 NOTE — TELEPHONE ENCOUNTER
Pt believes she is experiencing recurring yeast infection and asking to see provider soon. Is it ok to place her with dr. sevilla tomorrow at 1:00?

## 2022-08-16 NOTE — TELEPHONE ENCOUNTER
Called pt to let her know we have already scheduled her for 8/17 at 1:00. No VM set up, waiting for call back.

## 2022-08-17 ENCOUNTER — OFFICE VISIT (OUTPATIENT)
Dept: OBSTETRICS AND GYNECOLOGY | Facility: CLINIC | Age: 27
End: 2022-08-17

## 2022-08-17 VITALS
BODY MASS INDEX: 26.78 KG/M2 | DIASTOLIC BLOOD PRESSURE: 92 MMHG | SYSTOLIC BLOOD PRESSURE: 128 MMHG | HEART RATE: 76 BPM | WEIGHT: 156 LBS

## 2022-08-17 DIAGNOSIS — N89.8 VAGINAL ITCHING: Primary | ICD-10-CM

## 2022-08-17 PROCEDURE — 99213 OFFICE O/P EST LOW 20 MIN: CPT | Performed by: OBSTETRICS & GYNECOLOGY

## 2022-08-17 RX ORDER — HYDROXYZINE HYDROCHLORIDE 10 MG/1
TABLET, FILM COATED ORAL
COMMUNITY
Start: 2022-08-01

## 2022-08-17 NOTE — PROGRESS NOTES
"Chief Complaint  Vaginal Discharge- Pt c/o vaginal itching and soreness.  Pt states she used to get recurring yeast infections when she had her son.      Subjective        Katt Nunn presents to [facility]  History of Present Illness  Patient is a 27-year-old that presents complaining of vaginal itching and soreness for the last 2 days.  She states she is at the end of her menstrual period and was using superabsorbency tampons even though the flow was light.  She has not tried any over-the-counter medications.  Objective   Vital Signs:  /92   Pulse 76   Wt 70.8 kg (156 lb)   BMI 26.78 kg/m²   Estimated body mass index is 26.78 kg/m² as calculated from the following:    Height as of 4/19/22: 162.6 cm (64\").    Weight as of this encounter: 70.8 kg (156 lb).          Physical Exam  Vitals reviewed. Exam conducted with a chaperone present.   Constitutional:       Appearance: She is well-developed.   Genitourinary:     Labia:         Right: No rash, tenderness, lesion or injury.         Left: No rash, tenderness, lesion or injury.       Vagina: Vaginal discharge (Small amount of thick, white discharge) present.   Neurological:      Mental Status: She is alert.   Psychiatric:         Behavior: Behavior normal.        Result Review :                Assessment and Plan   Diagnoses and all orders for this visit:    1. Vaginal itching (Primary)  -     NuSwab VG+ - Swab, Vagina      Vaginal culture was obtained.  She will be contacted with results.       Follow Up   No follow-ups on file.  Patient was given instructions and counseling regarding her condition or for health maintenance advice. Please see specific information pulled into the AVS if appropriate.       "

## 2022-08-19 LAB
A VAGINAE DNA VAG QL NAA+PROBE: ABNORMAL SCORE
BVAB2 DNA VAG QL NAA+PROBE: ABNORMAL SCORE
C ALBICANS DNA VAG QL NAA+PROBE: POSITIVE
C GLABRATA DNA VAG QL NAA+PROBE: NEGATIVE
C TRACH DNA VAG QL NAA+PROBE: NEGATIVE
MEGA1 DNA VAG QL NAA+PROBE: ABNORMAL SCORE
N GONORRHOEA DNA VAG QL NAA+PROBE: NEGATIVE
T VAGINALIS DNA VAG QL NAA+PROBE: NEGATIVE

## 2022-08-19 RX ORDER — FLUCONAZOLE 150 MG/1
150 TABLET ORAL ONCE
Qty: 1 TABLET | Refills: 0 | Status: SHIPPED | OUTPATIENT
Start: 2022-08-19 | End: 2022-08-19

## 2022-08-22 ENCOUNTER — TELEPHONE (OUTPATIENT)
Dept: OBSTETRICS AND GYNECOLOGY | Facility: CLINIC | Age: 27
End: 2022-08-22

## 2022-08-23 ENCOUNTER — TELEPHONE (OUTPATIENT)
Dept: OBSTETRICS AND GYNECOLOGY | Facility: CLINIC | Age: 27
End: 2022-08-23

## 2022-08-23 NOTE — TELEPHONE ENCOUNTER
----- Message from Franchesca Tanner MD sent at 8/19/2022  8:33 AM EDT -----  Let patient know her culture was positive for yeast infection and I have sent rx for diflucan

## 2022-10-09 DIAGNOSIS — F41.8 POSTPARTUM ANXIETY: ICD-10-CM

## 2022-10-10 ENCOUNTER — TELEPHONE (OUTPATIENT)
Dept: OBSTETRICS AND GYNECOLOGY | Facility: CLINIC | Age: 27
End: 2022-10-10

## 2022-10-10 DIAGNOSIS — Z30.41 ENCOUNTER FOR SURVEILLANCE OF CONTRACEPTIVE PILLS: ICD-10-CM

## 2022-10-10 DIAGNOSIS — F41.8 POSTPARTUM ANXIETY: ICD-10-CM

## 2022-10-10 RX ORDER — NORETHINDRONE ACETATE AND ETHINYL ESTRADIOL 1; .02 MG/1; MG/1
1 TABLET ORAL DAILY
Qty: 84 TABLET | Refills: 0 | Status: SHIPPED | OUTPATIENT
Start: 2022-10-10 | End: 2022-12-30

## 2022-10-10 NOTE — TELEPHONE ENCOUNTER
"Pt informed of rx's.  She did speak with insurance, they provided \"a number\" for her.  She may  as long as she has not filled any prescriptions earlier than allowed, within the last year.       "
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
## 2022-10-14 ENCOUNTER — TELEPHONE (OUTPATIENT)
Dept: OBSTETRICS AND GYNECOLOGY | Facility: CLINIC | Age: 27
End: 2022-10-14

## 2022-10-14 NOTE — TELEPHONE ENCOUNTER
Reassured patient and to take pregnancy test again in one week but to continue taking OCP's per Dr. Hood.

## 2022-10-14 NOTE — TELEPHONE ENCOUNTER
Provider: DR WHITMAN  Caller: ELOISA ZAMAN  Relationship to Patient: SELF  Pharmacy: [facility]  [phone]  Reason for Call: PT IS CALLING TO ASK SOME QUESTIONS IN REGARDS TO HER BIRTH CONTROL.  SHE TOOK HER LAST PILL 10/6/22 HOWEVER SHE HAS NOT STARTED HER CYCLE YET.  SHE TOOK A PREGNANCY TEST AND IT WAS NEGATIVE. SHE IS WANTING TO KNOW IF SHE SHOULD GO AHEAD AND CONTINUE TAKING THEM OR NOT.  SHE WAS INPATIENT FOR A WHILE AND THEY HAD SWITCHED HER BIRTH CONTROL MEDICATION BUT SHE DOES NOT KNOW IF THAT HAS ANYTHING TO DO WITH IT. SHE IS BACK ON THE BIRTH CONTROL THIS OFFICE PRESCRIBED. SHE HAS HAD CRAMPING JUST NO CYCLE.  PLEASE CALL TO ADVISE PT.

## 2022-10-14 NOTE — TELEPHONE ENCOUNTER
Please reassure her that sometimes with changes in birth control she may experience no bleeding. I do recommend she take another pregnancy test in 1 week but continuing to take birth control will not harm a pregnancy. Thanks!

## 2022-12-30 DIAGNOSIS — Z30.41 ENCOUNTER FOR SURVEILLANCE OF CONTRACEPTIVE PILLS: ICD-10-CM

## 2022-12-30 RX ORDER — NORETHINDRONE ACETATE AND ETHINYL ESTRADIOL 1; 20 MG/1; UG/1
TABLET ORAL
Qty: 84 TABLET | Refills: 0 | Status: SHIPPED | OUTPATIENT
Start: 2022-12-30 | End: 2023-01-11 | Stop reason: SDUPTHER

## 2023-01-10 ENCOUNTER — OFFICE VISIT (OUTPATIENT)
Dept: OBSTETRICS AND GYNECOLOGY | Facility: CLINIC | Age: 28
End: 2023-01-10
Payer: COMMERCIAL

## 2023-01-10 VITALS
HEIGHT: 64 IN | WEIGHT: 148.8 LBS | HEART RATE: 93 BPM | SYSTOLIC BLOOD PRESSURE: 134 MMHG | BODY MASS INDEX: 25.4 KG/M2 | DIASTOLIC BLOOD PRESSURE: 91 MMHG

## 2023-01-10 DIAGNOSIS — N94.10 DYSPAREUNIA, FEMALE: Primary | ICD-10-CM

## 2023-01-10 PROCEDURE — 99213 OFFICE O/P EST LOW 20 MIN: CPT | Performed by: NURSE PRACTITIONER

## 2023-01-10 RX ORDER — ESTRADIOL 0.1 MG/G
2 CREAM VAGINAL DAILY
Qty: 42.5 G | Refills: 0 | Status: SHIPPED | OUTPATIENT
Start: 2023-01-10 | End: 2023-01-17

## 2023-01-10 RX ORDER — IBUPROFEN 800 MG/1
800 TABLET ORAL
COMMUNITY
Start: 2023-01-09 | End: 2023-01-16

## 2023-01-10 NOTE — PROGRESS NOTES
Chief Complaint   Patient presents with   • Vaginal Pain     Pt c/o intermittent vaginal pain during intercourse x 2-3 wks      SUBJECTIVE:     Katt Nunn is a 27 y.o.  who presents with c/o pain during IC for 2-3 weeks. Symptoms have significantly improved, but still slightly present. Denies postcoital spotting. She has had one new partner. This is a new problem. LMP 23. She was seen by Urgent Care for this recently, states she had negative STD testing with them. She has test results with her today which indicate negative BV, yeast, chlamydia, gonorrhea, and trichomonas on 22.     She is a patient of Dr. Tanner's.    Past Medical History:   Diagnosis Date   • High cholesterol    • Scoliosis       Past Surgical History:   Procedure Laterality Date   •  SECTION N/A 3/28/2018    Procedure:  SECTION PRIMARY;  Surgeon: Franchesca Tanner MD;  Location: [facility] LABOR DELIVERY;  Service: Obstetrics/Gynecology   •  SECTION N/A 2020    Procedure:  SECTION REPEAT;  Surgeon: Franchesca Tanner MD;  Location: [facility] LABOR DELIVERY;  Service: Obstetrics/Gynecology;  Laterality: N/A;   • WISDOM TOOTH EXTRACTION          Review of Systems   Constitutional: Negative for chills, fatigue and fever.   Gastrointestinal: Negative for abdominal distention and abdominal pain.   Genitourinary: Positive for dyspareunia. Negative for dysuria, menstrual problem, pelvic pain, vaginal bleeding, vaginal discharge and vaginal pain.       OBJECTIVE:   Vitals:    01/10/23 1412   BP: 134/91   Pulse: 93   Weight: 67.5 kg (148 lb 12.8 oz)   Height: 162.6 cm (64\")        Physical Exam  Constitutional:       General: She is not in acute distress.     Appearance: Normal appearance. She is not ill-appearing, toxic-appearing or diaphoretic.   Genitourinary:      Bladder and urethral meatus normal.      No lesions in the vagina.      Right Labia: No rash, tenderness, lesions, skin changes or  Bartholin's cyst.     Left Labia: No tenderness, lesions, skin changes, Bartholin's cyst or rash.     No labial fusion noted.      No inguinal adenopathy present in the right or left side.     No vaginal discharge, erythema, tenderness, bleeding, ulceration or granulation tissue.      No vaginal prolapse present.     No vaginal atrophy present.       Right Adnexa: not tender, not full, not palpable, no mass present and not absent.     Left Adnexa: not tender, not full, not palpable, no mass present and not absent.     No cervical motion tenderness, discharge, friability, lesion, polyp, nabothian cyst or eversion.      Uterus is not enlarged, fixed, tender, irregular or prolapsed.      No uterine mass detected.  Cardiovascular:      Rate and Rhythm: Normal rate.   Pulmonary:      Effort: Pulmonary effort is normal.   Abdominal:      General: There is no distension.      Palpations: Abdomen is soft. There is no mass.      Tenderness: There is no abdominal tenderness. There is no guarding.      Hernia: No hernia is present. There is no hernia in the left inguinal area or right inguinal area.   Musculoskeletal:         General: Normal range of motion.      Cervical back: Normal range of motion.   Lymphadenopathy:      Lower Body: No right inguinal adenopathy. No left inguinal adenopathy.   Neurological:      General: No focal deficit present.      Mental Status: She is alert and oriented to person, place, and time.      Cranial Nerves: No cranial nerve deficit.   Skin:     General: Skin is warm and dry.   Psychiatric:         Mood and Affect: Mood normal.         Behavior: Behavior normal.         Thought Content: Thought content normal.         Judgment: Judgment normal.   Vitals and nursing note reviewed.         Assessment/Plan    Diagnoses and all orders for this visit:    1. Dyspareunia, female (Primary)  -     estradiol (ESTRACE VAGINAL) 0.1 MG/GM vaginal cream; Insert 2 g into the vagina Daily for 7 days.   Dispense: 42.5 g; Refill: 0    Mild pain with bimanual exam, no lesions or masses noted  Suspect pain could have been RT trauma  Encouraged lubricants with IC  Will use vaginal estrace X1 week.  Reviewed negative NuSwab + via [employer] taina.   RTO if no improvement in 2 weeks    Follow up: PRN 2 weeks, sooner with worsening symptoms    I spent 22 minutes caring for Katt on this date of service. This time includes time spent by me in the following activities: preparing for the visit, reviewing tests, obtaining and/or reviewing a separately obtained history, performing a medically appropriate examination and/or evaluation, counseling and educating the patient/family/caregiver, ordering medications, tests, or procedures, referring and communicating with other health care professionals and documenting information in the medical record    Mary Miramontes, APRN  1/10/2023  15:32 EST

## 2023-01-11 ENCOUNTER — TELEPHONE (OUTPATIENT)
Dept: OBSTETRICS AND GYNECOLOGY | Facility: CLINIC | Age: 28
End: 2023-01-11
Payer: COMMERCIAL

## 2023-01-11 DIAGNOSIS — Z30.41 ENCOUNTER FOR SURVEILLANCE OF CONTRACEPTIVE PILLS: ICD-10-CM

## 2023-01-11 RX ORDER — NORETHINDRONE ACETATE AND ETHINYL ESTRADIOL 1; .02 MG/1; MG/1
1 TABLET ORAL DAILY
Qty: 84 TABLET | Refills: 0 | Status: SHIPPED | OUTPATIENT
Start: 2023-01-11

## 2023-01-11 NOTE — TELEPHONE ENCOUNTER
Caller: Katt Nunn    Relationship: Self    Best call back number: [phone]    Requested Prescriptions:   Junel 1/20 1-20 MG-MCG per tablet         Pharmacy where request should be sent: [facility] @   [address], [place] [address] ; PN: [phone]    Additional details provided by patient: SHE IS COMPLETELY OUT AND REQUESTING TO HAVE IT FILLED BY THE END OF THE DAY IF POSSIBLE     Does the patient have less than a 3 day supply:  [x] Yes  [] No    Would you like a call back once the refill request has been completed: [x] Yes [] No    If the office needs to give you a call back, can they leave a voicemail: [x] Yes [] No

## 2023-03-29 DIAGNOSIS — Z30.41 ENCOUNTER FOR SURVEILLANCE OF CONTRACEPTIVE PILLS: ICD-10-CM

## 2023-03-29 RX ORDER — NORETHINDRONE ACETATE AND ETHINYL ESTRADIOL 1; 20 MG/1; UG/1
TABLET ORAL
Qty: 84 TABLET | Refills: 0 | OUTPATIENT
Start: 2023-03-29

## 2023-05-25 ENCOUNTER — TELEPHONE (OUTPATIENT)
Dept: OBSTETRICS AND GYNECOLOGY | Facility: CLINIC | Age: 28
End: 2023-05-25
Payer: COMMERCIAL

## 2023-05-25 RX ORDER — FLUCONAZOLE 150 MG/1
150 TABLET ORAL ONCE
Qty: 1 TABLET | Refills: 0 | Status: SHIPPED | OUTPATIENT
Start: 2023-05-25 | End: 2023-05-25

## 2023-05-25 NOTE — TELEPHONE ENCOUNTER
WILMAR cervantes. Flores pt. Last seen 1/10/23 Dyspareunia, AE scheduled 9/11/23. Requesting Rx for a yeast infection. [facility] pharmacy. Thank you

## 2023-05-25 NOTE — TELEPHONE ENCOUNTER
Caller: Katt Nunn    Relationship: Self    Best call back number: [phone]    What was the call regarding: PT REQUESTING PRESCRIPTION MEDICATION FOR A YEAST INFECTION    Do you require a callback: YES, OKAY TO LEAVE A .

## 2023-05-25 NOTE — TELEPHONE ENCOUNTER
Spoke to  to let him know that Dr Lott sent in Diflucan to [facility] at [address] in [facility]. Thank you

## 2023-05-26 ENCOUNTER — TELEPHONE (OUTPATIENT)
Dept: OBSTETRICS AND GYNECOLOGY | Facility: CLINIC | Age: 28
End: 2023-05-26

## 2023-05-26 NOTE — TELEPHONE ENCOUNTER
Caller: Katt Nunn    Relationship to patient: Self    Best call back number: [phone]    Patient is needing: PATIENT WAS PRESCRIBED DIFLUCAN FOR YEAST SYMPTOMS YESTERDAY 5/25/23. PATIENT STATES SYMPTOMS ARE WORSENING AND WOULD LIKE ANOTHER PRESCRIPTION SENT TO PHARMACY. HUB UNABLE TO WARM TRANSFER CALL DUE TO OFFICE CLOSED AT 4:50PM. PATIENT ADVISED TO CALL PROVIDER ON CALL OR GO TO LOCAL ER IF SYMPTOMS ARE UNBEARABLE.

## 2023-05-30 RX ORDER — FLUCONAZOLE 150 MG/1
150 TABLET ORAL ONCE
Qty: 1 TABLET | Refills: 0 | Status: SHIPPED | OUTPATIENT
Start: 2023-05-30 | End: 2023-05-30

## 2023-05-30 NOTE — TELEPHONE ENCOUNTER
Left message for Katt that Dr Tanner sent in an Rx for diflucan to your [facility] at [id number] [place] [address] in [facility]. Dr Tanner said if symptoms still not better, you will need to be seen by her or KATY Smith. Thank you

## 2023-10-31 ENCOUNTER — HOSPITAL ENCOUNTER (EMERGENCY)
Facility: HOSPITAL | Age: 28
Discharge: HOME | End: 2023-10-31
Attending: EMERGENCY MEDICINE
Payer: COMMERCIAL

## 2023-10-31 VITALS
OXYGEN SATURATION: 98 % | WEIGHT: 136.69 LBS | HEIGHT: 64 IN | TEMPERATURE: 97.8 F | HEART RATE: 118 BPM | RESPIRATION RATE: 16 BRPM | DIASTOLIC BLOOD PRESSURE: 84 MMHG | SYSTOLIC BLOOD PRESSURE: 110 MMHG | BODY MASS INDEX: 23.34 KG/M2

## 2023-10-31 DIAGNOSIS — R74.8 ELEVATED LIVER ENZYMES: ICD-10-CM

## 2023-10-31 DIAGNOSIS — F10.930 ALCOHOL WITHDRAWAL SYNDROME WITHOUT COMPLICATION: Primary | ICD-10-CM

## 2023-10-31 LAB
ALBUMIN SERPL-MCNC: 4.4 G/DL (ref 3.5–5.2)
ALBUMIN/GLOB SERPL: 1.6 G/DL
ALP SERPL-CCNC: 121 U/L (ref 39–117)
ALT SERPL W P-5'-P-CCNC: 270 U/L (ref 1–33)
AMPHET+METHAMPHET UR QL: NEGATIVE
ANION GAP SERPL CALCULATED.3IONS-SCNC: 18.2 MMOL/L (ref 5–15)
APAP SERPL-MCNC: <5 MCG/ML (ref 0–30)
AST SERPL-CCNC: 512 U/L (ref 1–32)
BACTERIA UR QL AUTO: ABNORMAL /HPF
BARBITURATES UR QL SCN: NEGATIVE
BASOPHILS # BLD AUTO: 0.03 10*3/MM3 (ref 0–0.2)
BASOPHILS NFR BLD AUTO: 0.5 % (ref 0–1.5)
BENZODIAZ UR QL SCN: NEGATIVE
BILIRUB SERPL-MCNC: 2 MG/DL (ref 0–1.2)
BILIRUB UR QL STRIP: ABNORMAL
BUN SERPL-MCNC: 5 MG/DL (ref 6–20)
BUN/CREAT SERPL: 7.7 (ref 7–25)
CALCIUM SPEC-SCNC: 9.7 MG/DL (ref 8.6–10.5)
CANNABINOIDS SERPL QL: NEGATIVE
CHLORIDE SERPL-SCNC: 98 MMOL/L (ref 98–107)
CLARITY UR: ABNORMAL
CO2 SERPL-SCNC: 25.8 MMOL/L (ref 22–29)
COCAINE UR QL: NEGATIVE
COLOR UR: ABNORMAL
CREAT SERPL-MCNC: 0.65 MG/DL (ref 0.57–1)
DEPRECATED RDW RBC AUTO: 63.3 FL (ref 37–54)
EGFRCR SERPLBLD CKD-EPI 2021: 124.7 ML/MIN/1.73
EOSINOPHIL # BLD AUTO: 0.02 10*3/MM3 (ref 0–0.4)
EOSINOPHIL NFR BLD AUTO: 0.3 % (ref 0.3–6.2)
ERYTHROCYTE [DISTWIDTH] IN BLOOD BY AUTOMATED COUNT: 17.8 % (ref 12.3–15.4)
ETHANOL BLD-MCNC: <10 MG/DL (ref 0–10)
ETHANOL UR QL: <0.01 %
FENTANYL UR-MCNC: NEGATIVE NG/ML
GLOBULIN UR ELPH-MCNC: 2.8 GM/DL
GLUCOSE SERPL-MCNC: 142 MG/DL (ref 65–99)
GLUCOSE UR STRIP-MCNC: ABNORMAL MG/DL
HCG INTACT+B SERPL-ACNC: <0.5 MIU/ML
HCT VFR BLD AUTO: 46 % (ref 34–46.6)
HGB BLD-MCNC: 15.5 G/DL (ref 12–15.9)
HGB UR QL STRIP.AUTO: ABNORMAL
HOLD SPECIMEN: NORMAL
HOLD SPECIMEN: NORMAL
HYALINE CASTS UR QL AUTO: ABNORMAL /LPF
IMM GRANULOCYTES # BLD AUTO: 0.01 10*3/MM3 (ref 0–0.05)
IMM GRANULOCYTES NFR BLD AUTO: 0.2 % (ref 0–0.5)
KETONES UR QL STRIP: ABNORMAL
LEUKOCYTE ESTERASE UR QL STRIP.AUTO: NEGATIVE
LYMPHOCYTES # BLD AUTO: 0.67 10*3/MM3 (ref 0.7–3.1)
LYMPHOCYTES NFR BLD AUTO: 11.2 % (ref 19.6–45.3)
MCH RBC QN AUTO: 32.6 PG (ref 26.6–33)
MCHC RBC AUTO-ENTMCNC: 33.7 G/DL (ref 31.5–35.7)
MCV RBC AUTO: 96.6 FL (ref 79–97)
METHADONE UR QL SCN: NEGATIVE
MONOCYTES # BLD AUTO: 0.38 10*3/MM3 (ref 0.1–0.9)
MONOCYTES NFR BLD AUTO: 6.4 % (ref 5–12)
NEUTROPHILS NFR BLD AUTO: 4.86 10*3/MM3 (ref 1.7–7)
NEUTROPHILS NFR BLD AUTO: 81.4 % (ref 42.7–76)
NITRITE UR QL STRIP: POSITIVE
NRBC BLD AUTO-RTO: 0 /100 WBC (ref 0–0.2)
OPIATES UR QL: NEGATIVE
OXYCODONE UR QL SCN: NEGATIVE
PH UR STRIP.AUTO: 6.5 [PH] (ref 5–8)
PLATELET # BLD AUTO: 150 10*3/MM3 (ref 140–450)
PMV BLD AUTO: 12 FL (ref 6–12)
POTASSIUM SERPL-SCNC: 3.4 MMOL/L (ref 3.5–5.2)
PROT SERPL-MCNC: 7.2 G/DL (ref 6–8.5)
PROT UR QL STRIP: ABNORMAL
QT INTERVAL: 369 MS
QTC INTERVAL: 437 MS
RBC # BLD AUTO: 4.76 10*6/MM3 (ref 3.77–5.28)
RBC # UR STRIP: ABNORMAL /HPF
REF LAB TEST METHOD: ABNORMAL
SALICYLATES SERPL-MCNC: <0.3 MG/DL
SODIUM SERPL-SCNC: 142 MMOL/L (ref 136–145)
SP GR UR STRIP: >=1.03 (ref 1–1.03)
SQUAMOUS #/AREA URNS HPF: ABNORMAL /HPF
UROBILINOGEN UR QL STRIP: ABNORMAL
WBC # UR STRIP: ABNORMAL /HPF
WBC NRBC COR # BLD: 5.97 10*3/MM3 (ref 3.4–10.8)
WHOLE BLOOD HOLD COAG: NORMAL
WHOLE BLOOD HOLD SPECIMEN: NORMAL

## 2023-10-31 PROCEDURE — 96374 THER/PROPH/DIAG INJ IV PUSH: CPT

## 2023-10-31 PROCEDURE — 80307 DRUG TEST PRSMV CHEM ANLYZR: CPT | Performed by: EMERGENCY MEDICINE

## 2023-10-31 PROCEDURE — 81001 URINALYSIS AUTO W/SCOPE: CPT | Performed by: EMERGENCY MEDICINE

## 2023-10-31 PROCEDURE — 25010000002 ONDANSETRON PER 1 MG: Performed by: EMERGENCY MEDICINE

## 2023-10-31 PROCEDURE — 25810000003 SODIUM CHLORIDE 0.9 % SOLUTION: Performed by: EMERGENCY MEDICINE

## 2023-10-31 PROCEDURE — 80143 DRUG ASSAY ACETAMINOPHEN: CPT | Performed by: EMERGENCY MEDICINE

## 2023-10-31 PROCEDURE — 93005 ELECTROCARDIOGRAM TRACING: CPT | Performed by: EMERGENCY MEDICINE

## 2023-10-31 PROCEDURE — 85025 COMPLETE CBC W/AUTO DIFF WBC: CPT

## 2023-10-31 PROCEDURE — 93005 ELECTROCARDIOGRAM TRACING: CPT

## 2023-10-31 PROCEDURE — 96375 TX/PRO/DX INJ NEW DRUG ADDON: CPT

## 2023-10-31 PROCEDURE — 96376 TX/PRO/DX INJ SAME DRUG ADON: CPT

## 2023-10-31 PROCEDURE — 82077 ASSAY SPEC XCP UR&BREATH IA: CPT | Performed by: EMERGENCY MEDICINE

## 2023-10-31 PROCEDURE — 80053 COMPREHEN METABOLIC PANEL: CPT

## 2023-10-31 PROCEDURE — 25010000002 LORAZEPAM PER 2 MG: Performed by: EMERGENCY MEDICINE

## 2023-10-31 PROCEDURE — 36415 COLL VENOUS BLD VENIPUNCTURE: CPT

## 2023-10-31 PROCEDURE — 80179 DRUG ASSAY SALICYLATE: CPT | Performed by: EMERGENCY MEDICINE

## 2023-10-31 PROCEDURE — 99283 EMERGENCY DEPT VISIT LOW MDM: CPT

## 2023-10-31 PROCEDURE — 84702 CHORIONIC GONADOTROPIN TEST: CPT | Performed by: EMERGENCY MEDICINE

## 2023-10-31 RX ORDER — LORAZEPAM 2 MG/ML
1 INJECTION INTRAMUSCULAR ONCE
Status: COMPLETED | OUTPATIENT
Start: 2023-10-31 | End: 2023-10-31

## 2023-10-31 RX ORDER — SODIUM CHLORIDE 0.9 % (FLUSH) 0.9 %
10 SYRINGE (ML) INJECTION AS NEEDED
Status: DISCONTINUED | OUTPATIENT
Start: 2023-10-31 | End: 2023-10-31 | Stop reason: HOSPADM

## 2023-10-31 RX ORDER — ONDANSETRON 2 MG/ML
4 INJECTION INTRAMUSCULAR; INTRAVENOUS ONCE
Status: COMPLETED | OUTPATIENT
Start: 2023-10-31 | End: 2023-10-31

## 2023-10-31 RX ADMIN — LORAZEPAM 1 MG: 2 INJECTION INTRAMUSCULAR; INTRAVENOUS at 13:55

## 2023-10-31 RX ADMIN — SODIUM CHLORIDE 1000 ML: 9 INJECTION, SOLUTION INTRAVENOUS at 13:10

## 2023-10-31 RX ADMIN — ONDANSETRON 4 MG: 2 INJECTION INTRAMUSCULAR; INTRAVENOUS at 13:12

## 2023-10-31 RX ADMIN — LORAZEPAM 1 MG: 2 INJECTION INTRAMUSCULAR; INTRAVENOUS at 13:14

## 2023-10-31 RX ADMIN — SODIUM CHLORIDE 1000 ML: 9 INJECTION, SOLUTION INTRAVENOUS at 13:54

## 2023-10-31 RX ADMIN — LORAZEPAM 1 MG: 2 INJECTION INTRAMUSCULAR; INTRAVENOUS at 14:47

## 2023-10-31 NOTE — ED PROVIDER NOTES
21-Dec-2017 22:45 Time: 12:47 PM EDT  Date of encounter:  10/31/2023  Independent Historian/Clinical History and Information was obtained by:   Patient    History is limited by: N/A    Chief Complaint: Alcohol withdrawal      History of Present Illness:  Patient is a 28 y.o. year old female who presents to the emergency department for evaluation of alcohol withdrawal.  [facility] went [facility] to [facility] for admission for [facility] detox/rehab.  Her last alcohol drink was this morning about 6 AM.  The patient is having tremors with nausea and vomiting.  They referred her to the ER to be better stabilized before her being admitted there.  Patient denies any issues with drug abuse.    HPI    Patient Care Team  Primary Care Provider: Mary Anne Antoine MD    Past Medical History:     No Known Allergies  Past Medical History:   Diagnosis Date    High cholesterol     Scoliosis      Past Surgical History:   Procedure Laterality Date     SECTION N/A 3/28/2018    Procedure:  SECTION PRIMARY;  Surgeon: Franchesca Tanner MD;  Location: [facility] LABOR DELIVERY;  Service: Obstetrics/Gynecology     SECTION N/A 2020    Procedure:  SECTION REPEAT;  Surgeon: Franchesca Tanner MD;  Location: [facility] LABOR DELIVERY;  Service: Obstetrics/Gynecology;  Laterality: N/A;    WISDOM TOOTH EXTRACTION       No family history on file.    Home Medications:  Prior to Admission medications    Medication Sig Start Date End Date Taking? Authorizing Provider   hydrOXYzine (ATARAX) 25 MG tablet Take 1 tablet by mouth Every 6 (Six) Hours As Needed. for anxiety 23   Nat Mcmahon MD   norethindrone-ethinyl estradiol (Junel ) 1-20 MG-MCG per tablet Take 1 tablet by mouth Daily. 23   Franchesca Tanner MD   ondansetron ODT (ZOFRAN-ODT) 4 MG disintegrating tablet DISSOLVE 1 TABLET ON THE TONGUE THREE TIMES DAILY AS NEEDED FOR NAUSEA 23   Nat Mcmahon MD   PARoxetine (PAXIL) 10 MG tablet TAKE 1 TABLET  "BY MOUTH 1 TIME EACH DAY IN THE MORNING. 3/20/23   ProviderNat MD   propranolol (INDERAL) 10 MG tablet TAKE 1 TABLET (10 MG TOTAL) BY MOUTH 1 (ONE) TIME EACH DAY IF NEEDED (JANETT). 3/20/23   ProviderNat MD        Social History:   Social History     Tobacco Use    Smoking status: Never    Smokeless tobacco: Never   Substance Use Topics    Alcohol use: Yes     Comment: social    Drug use: No         Review of Systems:  Review of Systems   Gastrointestinal:  Positive for nausea and vomiting.   Neurological:  Positive for tremors.        Physical Exam:  /82 (BP Location: Right arm)   Pulse 115   Temp 97.8 °F (36.6 °C) (Oral)   Resp 18   Ht 162.6 cm (64\")   Wt 62 kg (136 lb 11 oz)   SpO2 95%   BMI 23.46 kg/m²     Physical Exam  Vitals and nursing note reviewed.   Constitutional:       General: She is not in acute distress.     Appearance: Normal appearance.   HENT:      Head: Normocephalic and atraumatic.   Eyes:      Extraocular Movements: Extraocular movements intact.   Cardiovascular:      Rate and Rhythm: Regular rhythm. Tachycardia present.   Pulmonary:      Effort: Pulmonary effort is normal. No respiratory distress.      Breath sounds: Normal breath sounds.   Abdominal:      Palpations: Abdomen is soft.      Tenderness: There is no abdominal tenderness.   Musculoskeletal:         General: Normal range of motion.      Cervical back: Normal range of motion.   Skin:     General: Skin is warm and dry.   Neurological:      Mental Status: She is alert and oriented to person, place, and time.      Comments: Tremor present   Psychiatric:         Mood and Affect: Mood normal.                  Procedures:  Procedures      Medical Decision Making:      Comorbidities that affect care:    Substance Abuse    External Notes reviewed:    Previous ED Note: Seen at another emergency department on 4/19/2023 and admitted for alcohol abuse      The following orders were placed and all results were " independently analyzed by me:  Orders Placed This Encounter   Procedures    [place] Draw    Comprehensive Metabolic Panel    Urinalysis With Culture If Indicated -    CBC Auto Differential    NPO Diet NPO Type: Strict NPO    Undress & Gown    Continuous Pulse Oximetry    Vital Signs    Orthostatic Blood Pressure    Oxygen Therapy- Nasal Cannula; Titrate 1-6 LPM Per SpO2; 90 - 95%    POC Glucose Once    ECG 12 Lead ED Triage Standing Order; Weak / Dizzy / AMS    Insert Peripheral IV    Fall Precautions    CBC & Differential    Green Top (Gel)    Lavender Top    Gold Top - SST    Light Blue Top       Medications Given in the Emergency Department:  Medications   sodium chloride 0.9 % flush 10 mL (has no administration in time range)        ED Course:         Labs:    Lab Results (last 24 hours)       Procedure Component Value Units Date/Time    CBC & Differential [[id number]]  (Abnormal) Collected: 10/31/23 1128    Specimen: Blood Updated: 10/31/23 1150    Narrative:      The following orders were created for panel order CBC & Differential.  Procedure                               Abnormality         Status                     ---------                               -----------         ------                     CBC Auto Differential[[id number]]        Abnormal            Final result                 Please view results for these tests on the individual orders.    Comprehensive Metabolic Panel [[id number]]  (Abnormal) Collected: 10/31/23 1128    Specimen: Blood Updated: 10/31/23 1208     Glucose 142 mg/dL      BUN 5 mg/dL      Creatinine 0.65 mg/dL      Sodium 142 mmol/L      Potassium 3.4 mmol/L      Chloride 98 mmol/L      CO2 25.8 mmol/L      Calcium 9.7 mg/dL      Total Protein 7.2 g/dL      Albumin 4.4 g/dL      ALT (SGPT) 270 U/L      AST (SGOT) 512 U/L      Alkaline Phosphatase 121 U/L      Total Bilirubin 2.0 mg/dL      Globulin 2.8 gm/dL      A/G Ratio 1.6 g/dL      BUN/Creatinine Ratio 7.7     Anion Gap 18.2  mmol/L      eGFR 124.7 mL/min/1.73     Narrative:      GFR Normal >60  Chronic Kidney Disease <60  Kidney Failure <15      CBC Auto Differential [[id number]]  (Abnormal) Collected: 10/31/23 1128    Specimen: Blood Updated: 10/31/23 1150     WBC 5.97 10*3/mm3      RBC 4.76 10*6/mm3      Hemoglobin 15.5 g/dL      Hematocrit 46.0 %      MCV 96.6 fL      MCH 32.6 pg      MCHC 33.7 g/dL      RDW 17.8 %      RDW-SD 63.3 fl      MPV 12.0 fL      Platelets 150 10*3/mm3      Neutrophil % 81.4 %      Lymphocyte % 11.2 %      Monocyte % 6.4 %      Eosinophil % 0.3 %      Basophil % 0.5 %      Immature Grans % 0.2 %      Neutrophils, Absolute 4.86 10*3/mm3      Lymphocytes, Absolute 0.67 10*3/mm3      Monocytes, Absolute 0.38 10*3/mm3      Eosinophils, Absolute 0.02 10*3/mm3      Basophils, Absolute 0.03 10*3/mm3      Immature Grans, Absolute 0.01 10*3/mm3      nRBC 0.0 /100 WBC              Imaging:    No Radiology Exams Resulted Within Past 24 Hours      Differential Diagnosis and Discussion:    Psychiatric: Differential diagnosis includes but is not limited to depression, psychosis, bipolar disorder, anxiety, manic episode, schizophrenia, and substance abuse.    All labs were reviewed and interpreted by me.    MDM       {Critical Care:57939}    Patient Care Considerations:    {Considerations (Optional):86387}      Consultants/Shared Management Plan:    {Shared Management Plan (Optional):08476}    Social Determinants of Health:    Patient is independent, reliable, and has access to care.       Disposition and Care Coordination:    {Admission consideration:91867}    {Discharge (Optional):86457}    Final diagnoses:   None        ED Disposition       None            This medical record created using voice recognition software.           21-Dec-2017 22:50

## 2023-10-31 NOTE — DISCHARGE INSTRUCTIONS
Go directly to [facility] for alcohol detox/rehab.    Follow-up with Dr. Stevens, gastroenterology, regarding your elevated liver enzymes

## 2023-10-31 NOTE — SIGNIFICANT NOTE
10/31/23 1256   Plan   Final Note SW contacted [facility] who reports that pt was a new admission to their program today and needs medical clearance. Per [facility], once medically cleared call them and they will arrange to have pt picked up. SW updated provider.

## 2023-12-18 ENCOUNTER — TELEPHONE (OUTPATIENT)
Dept: OBSTETRICS AND GYNECOLOGY | Facility: CLINIC | Age: 28
End: 2023-12-18
Payer: COMMERCIAL

## 2023-12-27 ENCOUNTER — TELEPHONE (OUTPATIENT)
Dept: OBSTETRICS AND GYNECOLOGY | Facility: CLINIC | Age: 28
End: 2023-12-27
Payer: COMMERCIAL

## 2023-12-27 RX ORDER — FLUCONAZOLE 150 MG/1
150 TABLET ORAL ONCE
Qty: 1 TABLET | Refills: 0 | Status: SHIPPED | OUTPATIENT
Start: 2023-12-27 | End: 2023-12-27

## 2023-12-27 NOTE — TELEPHONE ENCOUNTER
Caller: Katt Nunn    Relationship: Self    Best call back number: [phone]    Requested Prescriptions: DIFLUCAN    Pharmacy where request should be sent:  [facility]    Last office visit with prescribing clinician: 8/17/2022     Additional details provided by patient: PATIENT HAS A YEAST INFECTION AND THIS IS THE ONLY MEDICINE THAT HELPS HER    Does the patient have less than a 3 day supply:  [x] Yes  [] No    Would you like a call back once the refill request has been completed: [x] Yes [] No    If the office needs to give you a call back, can they leave a voicemail: [] Yes [] No    Arvind Gaffney Rep   12/27/23 10:01 EST

## 2023-12-29 ENCOUNTER — TELEPHONE (OUTPATIENT)
Dept: OBSTETRICS AND GYNECOLOGY | Facility: CLINIC | Age: 28
End: 2023-12-29
Payer: COMMERCIAL

## 2023-12-29 NOTE — TELEPHONE ENCOUNTER
Pt called says she took the pill yesterday for yeast infection, she states its not going away still having some itching. Wants to know if she needs to come in or if you could send in another Rx. Please Advise.

## 2024-08-02 ENCOUNTER — TELEPHONE (OUTPATIENT)
Dept: OBSTETRICS AND GYNECOLOGY | Facility: CLINIC | Age: 29
End: 2024-08-02
Payer: COMMERCIAL

## 2024-08-02 RX ORDER — NORETHINDRONE ACETATE AND ETHINYL ESTRADIOL 1MG-20(21)
1 KIT ORAL DAILY
Qty: 28 TABLET | Refills: 0 | Status: SHIPPED | OUTPATIENT
Start: 2024-08-02 | End: 2025-08-02

## 2024-08-02 RX ORDER — NORETHINDRONE ACETATE AND ETHINYL ESTRADIOL 1MG-20(21)
1 KIT ORAL DAILY
Qty: 84 TABLET | OUTPATIENT
Start: 2024-08-02

## 2024-08-02 NOTE — TELEPHONE ENCOUNTER
Left message that Sarah Ordaz CNM sent in refill of OCP to [facility] and looks forward to seeing you at your AE 8/19/24. Thank you

## 2024-08-02 NOTE — TELEPHONE ENCOUNTER
Caller: NunnKatt    Relationship: Self    Best call back number: [phone] / LVM    Requested Prescriptions: norethindrone-ethinyl estradiol (Junel 1/20) 1-20 MG-MCG per tablet     Pharmacy where request should be sent:  [facility]    Last office visit with prescribing clinician: 8/17/2022   Last telemedicine visit with prescribing clinician: Visit date not found   Next office visit with prescribing clinician: Visit date not found     Additional details provided by patient: PT IS WANTING TO KNOW IF norethindrone-ethinyl estradiol (Junel 1/20) 1-20 MG-MCG per tablet CAN BE PRESCRIBED TO HER NOW BEFORE SHE IS SEEN ON 08/19/24 W/ KATY OKEEFE FOR HER ANNUAL APPT - PLEASE CALL THE PT TO CONFIRM    Does the patient have less than a 3 day supply:  [x] Yes  [] No    Would you like a call back once the refill request has been completed: [x] Yes [] No    If the office needs to give you a call back, can they leave a voicemail: [x] Yes [] No    Arvind Mac Rep   08/02/24 10:48 EDT

## 2024-08-02 NOTE — TELEPHONE ENCOUNTER
AE 8/17/2022. Last seen 6/19/23 dyspareunia. Cancel & no show 9/11/23, 12/14/23, 1/3/24. Scheduled AE with you on 8/19/24. Requesting refill of Junel 1/20 to [place]. Thank you

## 2024-08-22 ENCOUNTER — TELEPHONE (OUTPATIENT)
Dept: OBSTETRICS AND GYNECOLOGY | Facility: CLINIC | Age: 29
End: 2024-08-22
Payer: COMMERCIAL

## 2024-08-30 DIAGNOSIS — Z30.41 ENCOUNTER FOR SURVEILLANCE OF CONTRACEPTIVE PILLS: ICD-10-CM

## 2024-09-03 RX ORDER — NORETHINDRONE ACETATE AND ETHINYL ESTRADIOL .02; 1 MG/1; MG/1
1 TABLET ORAL DAILY
Qty: 84 TABLET | Refills: 0 | OUTPATIENT
Start: 2024-09-03

## 2024-12-06 ENCOUNTER — OFFICE VISIT (OUTPATIENT)
Dept: OBSTETRICS AND GYNECOLOGY | Facility: CLINIC | Age: 29
End: 2024-12-06
Payer: COMMERCIAL

## 2024-12-06 VITALS
HEIGHT: 64 IN | WEIGHT: 129 LBS | DIASTOLIC BLOOD PRESSURE: 85 MMHG | BODY MASS INDEX: 22.02 KG/M2 | SYSTOLIC BLOOD PRESSURE: 121 MMHG

## 2024-12-06 DIAGNOSIS — Z11.3 SCREENING FOR STD (SEXUALLY TRANSMITTED DISEASE): ICD-10-CM

## 2024-12-06 DIAGNOSIS — Z30.41 ENCOUNTER FOR SURVEILLANCE OF CONTRACEPTIVE PILLS: ICD-10-CM

## 2024-12-06 DIAGNOSIS — N91.2 AMENORRHEA: ICD-10-CM

## 2024-12-06 DIAGNOSIS — Z01.419 WOMEN'S ANNUAL ROUTINE GYNECOLOGICAL EXAMINATION: Primary | ICD-10-CM

## 2024-12-06 DIAGNOSIS — N89.8 VAGINAL ODOR: ICD-10-CM

## 2024-12-06 DIAGNOSIS — N94.10 DYSPAREUNIA, FEMALE: ICD-10-CM

## 2024-12-06 RX ORDER — BUPROPION HYDROCHLORIDE 150 MG/1
150 TABLET ORAL DAILY
COMMUNITY
Start: 2024-08-14

## 2024-12-06 RX ORDER — HYDROXYZINE HYDROCHLORIDE 50 MG/1
TABLET, FILM COATED ORAL
COMMUNITY
Start: 2024-09-24

## 2024-12-06 NOTE — PROGRESS NOTES
GYN Annual Exam     Chief Complaint   Patient presents with    Gynecologic Exam     Pt here today for AE, also occasional pain with intercourse, would like to be checked for vaginal infection      HPI    Katt Nunn is a 29 y.o. female who presents for annual well woman exam with several concerns.  She is sexually active. Performing SBE:occas. She is a patient of Dr. Anglin.     C/o occasional episodes of painful IC. Reports this pain only lasts for about 20 seconds when penetration is initiated and improves soon after. She has in the past been treated with a short course of vaginal estrace for painful IC. Denies issues with vaginal dryness.      C/o vaginal odor for one week. Denies discharge or itching. She would like STD testing today. She has a new partner. Denies new hygiene products.    C/o irregular menses since menarche unless on contraceptives. States she has only had one menses this year. Reports irregular episodes of light spotting only for several hours though. She does not track the timing of this spotting. She is noted to have a small amount of oxidized blood on exam, when asked about this she reports she had spotting yesterday.    She would like to discuss contraceptive options. Denies history of migraine with aura, denies history of DVT, there is no family history of DVT. She is vaping daily. No longer taking MARY, she is unsure when she stopped use.    OB History          2    Para   2    Term   2            AB        Living   2         SAB        IAB        Ectopic        Molar        Multiple   0    Live Births   2              LMP- unsure  Current contraception: condoms  Last Pap-  NIL  History of abnormal Pap smear: ASCUS 2018  History of STD-denies  Family history of uterine, colon or ovarian cancer: no  Family history of breast cancer: no  Gardasil Vaccine: completed    Past Medical History:   Diagnosis Date    High cholesterol     Scoliosis        Past Surgical  "History:   Procedure Laterality Date     SECTION N/A 3/28/2018    Procedure:  SECTION PRIMARY;  Surgeon: Franchesca Tanner MD;  Location: [facility] LABOR DELIVERY;  Service: Obstetrics/Gynecology     SECTION N/A 2020    Procedure:  SECTION REPEAT;  Surgeon: Franchesca Tanner MD;  Location: [facility] LABOR DELIVERY;  Service: Obstetrics/Gynecology;  Laterality: N/A;    WISDOM TOOTH EXTRACTION           Current Outpatient Medications:     buPROPion XL (WELLBUTRIN XL) 150 MG 24 hr tablet, Take 1 tablet by mouth Daily., Disp: , Rfl:     hydrOXYzine (ATARAX) 50 MG tablet, TAKE 1 TABLET(50 MG) BY MOUTH 1 TIME EACH DAY AS NEEDED FOR ITCHING, Disp: , Rfl:     norethindrone-ethinyl estradiol FE (Junel FE ) 1-20 MG-MCG per tablet, Take 1 tablet by mouth Daily., Disp: 28 tablet, Rfl: 0    No Known Allergies    Social History     Tobacco Use    Smoking status: Never    Smokeless tobacco: Never   Vaping Use    Vaping status: Some Days   Substance Use Topics    Alcohol use: Yes     Comment: social    Drug use: No       History reviewed. No pertinent family history.    Review of Systems   Constitutional:  Negative for chills, fatigue and fever.   Gastrointestinal:  Negative for abdominal distention, abdominal pain, nausea and vomiting.   Genitourinary:  Positive for dyspareunia and menstrual problem. Negative for breast discharge, breast lump, breast pain, dysuria, frequency, pelvic pain, pelvic pressure, urgency, vaginal bleeding, vaginal discharge and vaginal pain.        + vaginal odor   Musculoskeletal:  Negative for gait problem.   Skin:  Negative for rash.   Neurological:  Negative for dizziness and headache.   Psychiatric/Behavioral:  Negative for behavioral problems.      /85   Ht 162.6 cm (64\")   Wt 58.5 kg (129 lb)   BMI 22.14 kg/m²     Physical Exam  Constitutional:       General: She is not in acute distress.     Appearance: Normal appearance. She is not ill-appearing, " toxic-appearing or diaphoretic.   Genitourinary:      Vulva, bladder and urethral meatus normal.      No lesions in the vagina.      Right Labia: No rash, tenderness, lesions, skin changes or Bartholin's cyst.     Left Labia: No tenderness, lesions, skin changes, Bartholin's cyst or rash.     No labial fusion noted.      No inguinal adenopathy present in the right or left side.     Vaginal bleeding (small, oxidized) present.      No vaginal discharge, erythema, tenderness or ulceration.      No vaginal prolapse present.     No vaginal atrophy present.       Right Adnexa: not tender, not full, not palpable, no mass present and not absent.     Left Adnexa: not tender, not full, not palpable, no mass present and not absent.     No cervical motion tenderness, discharge, friability, lesion, polyp, nabothian cyst or eversion.      Uterus is not enlarged, fixed, tender, irregular or prolapsed.      No uterine mass detected.     No urethral tenderness or mass present.      Pelvic exam was performed with patient in the lithotomy position.   Breasts:     Breasts are symmetrical.      Right: Present. No swelling, bleeding, inverted nipple, mass, nipple discharge, skin change, tenderness or breast implant.      Left: Present. No swelling, bleeding, inverted nipple, mass, nipple discharge, skin change, tenderness or breast implant.   HENT:      Head: Normocephalic and atraumatic.   Eyes:      Pupils: Pupils are equal, round, and reactive to light.   Cardiovascular:      Rate and Rhythm: Normal rate.   Pulmonary:      Effort: Pulmonary effort is normal.   Abdominal:      General: There is no distension.      Palpations: Abdomen is soft. There is no mass.      Tenderness: There is no abdominal tenderness. There is no guarding.      Hernia: No hernia is present. There is no hernia in the left inguinal area or right inguinal area.   Musculoskeletal:         General: Normal range of motion.      Cervical back: Normal range of motion  and neck supple. No tenderness.   Lymphadenopathy:      Cervical: No cervical adenopathy.      Upper Body:      Right upper body: No supraclavicular, axillary or pectoral adenopathy.      Left upper body: No supraclavicular, axillary or pectoral adenopathy.      Lower Body: No right inguinal adenopathy. No left inguinal adenopathy.   Neurological:      General: No focal deficit present.      Mental Status: She is alert and oriented to person, place, and time.      Cranial Nerves: No cranial nerve deficit.   Skin:     General: Skin is warm and dry.      Comments: Multiple bruises in various states of healing, right breast, right upper arm, bilateral lower legs   Psychiatric:         Mood and Affect: Mood normal.         Behavior: Behavior normal.         Thought Content: Thought content normal.         Judgment: Judgment normal.   Vitals and nursing note reviewed.       Assessment   Diagnoses and all orders for this visit:    1. Women's annual routine gynecological examination (Primary)    2. Screening for STD (sexually transmitted disease)    3. Encounter for surveillance of contraceptive pills    4. Dyspareunia, female    5. Amenorrhea  -     DHEA-Sulfate  -     Testosterone  -     US Non-ob Transvaginal; Future  -     Prolactin    6. Vaginal odor  -     NuSwab VG+ - Swab, Vagina       Plan   Well woman exam: Pap smear up to date. Recommend MVI daily.    Contraception: Discussed contraception options at length including pills, patch, vaginal ring, POPs,  injection, implant, and IUDs.  The risks and benefits of the methods were discussed including but not limited to the increased risk of heart attack, blood clot, and stroke.  It was discussed the contraception does not protect against sexually transmitted infections and condoms are encouraged. She will consider her options and call when she decides how she would like to proceed.  STD: Enc condoms. Desires STD screen today- Yes. NuSwab, declines serum  testing  Smoking status: vaping   Encouraged annual mammogram screening starting at age 40. Instructed on how to perform SBE. Encouraged breast health self awareness.  6.    Encouraged 150 minutes of exercise per week if not medially contraindicated.   7.    BMI is within normal parameters. No other follow-up for BMI required.  9.    Multiple bruises in various states of healing noted, right breast, right upper arm, bilateral lower legs. Bruises on legs are large. She reports she feel down on Thanksgiving, reports she was intoxicate at the time.  10.  Vaginal odor: No abnormal discharge or odor noted on exam. Cultures obtained will await results and treat if indicated  11.  Amenorrhea: Recent normal TSH with PCP. Checking labs today. She states she has another appt and will complete labs at f/u visit. TVUS at next visit. Discussed possible causes and treatment options. Encouraged to track spotting, may be having a very light menses at this time. Planning to start contraceptives soon  12. Dyspareunia: Only lasting around 30 seconds per pt report. Pain is also rare. Encouraged to continue to monitor. Enc lubricants with IC    Return in about 1 week (around 12/13/2024) for Ultrasound, Follow up, KATY Muller.    I spent 45 minutes caring for Katt on this date of service. This time includes time spent by me in the following activities: preparing for the visit, reviewing tests, performing a medically appropriate examination and/or evaluation, counseling and educating the patient/family/caregiver, referring and communicating with other health care professionals, documenting information in the medical record, independently interpreting results and communicating that information with the patient/family/caregiver, ordering medications, ordering test(s), obtaining a separately obtained history, and reviewing a separately obtained history    KATY Osborne  12/6/2024  11:33 EST

## 2024-12-09 LAB
A VAGINAE DNA VAG QL NAA+PROBE: ABNORMAL SCORE
BVAB2 DNA VAG QL NAA+PROBE: ABNORMAL SCORE
C ALBICANS DNA VAG QL NAA+PROBE: NEGATIVE
C GLABRATA DNA VAG QL NAA+PROBE: NEGATIVE
C TRACH DNA SPEC QL NAA+PROBE: NEGATIVE
MEGA1 DNA VAG QL NAA+PROBE: ABNORMAL SCORE
N GONORRHOEA DNA VAG QL NAA+PROBE: NEGATIVE
T VAGINALIS DNA VAG QL NAA+PROBE: NEGATIVE

## 2024-12-09 RX ORDER — METRONIDAZOLE 65 MG/5G
1 GEL TOPICAL NIGHTLY
Qty: 5 G | Refills: 0 | Status: SHIPPED | OUTPATIENT
Start: 2024-12-09 | End: 2024-12-10

## 2024-12-12 ENCOUNTER — TELEPHONE (OUTPATIENT)
Dept: OBSTETRICS AND GYNECOLOGY | Facility: CLINIC | Age: 29
End: 2024-12-12
Payer: COMMERCIAL

## 2024-12-12 NOTE — TELEPHONE ENCOUNTER
Attempted to call pt about n/s on 12/10/24, phone not accepting calls at this time. Letter mailed.paul

## 2025-01-13 ENCOUNTER — TELEPHONE (OUTPATIENT)
Dept: OBSTETRICS AND GYNECOLOGY | Facility: CLINIC | Age: 30
End: 2025-01-13
Payer: COMMERCIAL

## 2025-02-24 ENCOUNTER — TELEPHONE (OUTPATIENT)
Dept: OBSTETRICS AND GYNECOLOGY | Facility: CLINIC | Age: 30
End: 2025-02-24
Payer: COMMERCIAL

## 2025-03-03 ENCOUNTER — TELEPHONE (OUTPATIENT)
Dept: OBSTETRICS AND GYNECOLOGY | Facility: CLINIC | Age: 30
End: 2025-03-03
Payer: COMMERCIAL

## 2025-03-03 RX ORDER — FLUCONAZOLE 150 MG/1
150 TABLET ORAL
Qty: 2 TABLET | Refills: 0 | Status: SHIPPED | OUTPATIENT
Start: 2025-03-03

## 2025-03-03 NOTE — TELEPHONE ENCOUNTER
Caller: Katt Nunn    Relationship: Self    Best call back number: : [phone]     What is the best time to reach you: ANY    Who are you requesting to speak with (clinical staff, provider,  specific staff member): PT REQ A CALL BACK REG TE BELOW FOR SAME DAY APPT

## 2025-03-03 NOTE — TELEPHONE ENCOUNTER
Caller: Katt Nunn    Relationship: Self    Best call back number: [phone]    What is the best time to reach you: ANY    Who are you requesting to speak with (clinical staff, provider,  specific staff member): ANY         What was the call regarding: PT IS STATING THAT SHE HAS A YEAST INFECTION AND WANTS TO COME INTO THE OFFICE TODAY . I OFFER MARCH 11TH BUT SHE IS NEEDING TO BE SEEN SOONER .    Is it okay if the provider responds through MyChart: NO

## 2025-03-03 NOTE — TELEPHONE ENCOUNTER
Pt would like prescription to treat yeast infection please. Thank you!  Pharmacy -   [id number] - [facility] [address] AT [facility] [phone] [facility] [phone] FX

## 2025-04-01 ENCOUNTER — TELEPHONE (OUTPATIENT)
Dept: OBSTETRICS AND GYNECOLOGY | Facility: CLINIC | Age: 30
End: 2025-04-01
Payer: COMMERCIAL

## 2025-04-01 RX ORDER — FLUCONAZOLE 150 MG/1
150 TABLET ORAL
Qty: 2 TABLET | Refills: 0 | Status: SHIPPED | OUTPATIENT
Start: 2025-04-01

## 2025-04-01 NOTE — TELEPHONE ENCOUNTER
Med request for yeast infection. AE 12/6/24. States she had one at the beginning of March and it is back. Would like Rx sent to pharmacy. Does she need to be seen? [place]. Thank you

## 2025-04-01 NOTE — TELEPHONE ENCOUNTER
KATY Smith sent another dose today, however if symptoms return in a short period of time again, Mary recommends you be seen for vaginal cultures. Thank you

## 2025-04-01 NOTE — TELEPHONE ENCOUNTER
I will send another dose today, however if symptoms return in a short period of time again I recommend she be seen for vaginal cultures. Thank you

## 2025-04-01 NOTE — TELEPHONE ENCOUNTER
Provider: KATY SIDDIQUI    Caller: Katt Nunn    Relationship to Patient: Self    Pharmacy: [facility] - CONFIRMED     Phone Number: [phone] / LVM    Reason for Call: PT IS HAVING REOCCURRING YEAST INFECTION - PT JUST HAD ONE AT THE BEGINNING OF MARCH AND IT IS BACK - PT WOULD LIKE MEDICATION TO BE CALLED INTO HER PHARMACY - IF PT NEEDS TO BE SEEN, PLEASE CONTACT HER TO MAKE AN APPT     THANK YOU!

## (undated) DEVICE — SOL IRR H2O BTL 1000ML STRL

## (undated) DEVICE — SUT VIC 0 CT 36IN J958H

## (undated) DEVICE — GLV SURG SENSICARE MICRO PF LF 6 STRL

## (undated) DEVICE — GLV SURG SENSICARE MICRO PF LF 6.5 STRL

## (undated) DEVICE — KENDALL SCD EXPRESS SLEEVES, KNEE LENGTH, MEDIUM: Brand: KENDALL SCD

## (undated) DEVICE — ANTIBACTERIAL UNDYED BRAIDED (POLYGLACTIN 910), SYNTHETIC ABSORBABLE SUTURE: Brand: COATED VICRYL

## (undated) DEVICE — 3M™ STERI-STRIP™ COMPOUND BENZOIN TINCTURE 40 BAGS/CARTON 4 CARTONS/CASE C1544: Brand: 3M™ STERI-STRIP™

## (undated) DEVICE — 3M™ STERI-STRIP™ REINFORCED ADHESIVE SKIN CLOSURES, R1547, 1/2 IN X 4 IN (12 MM X 100 MM), 6 STRIPS/ENVELOPE: Brand: 3M™ STERI-STRIP™

## (undated) DEVICE — 3M(TM) TEGADERM(TM) TRANSPARENT FILM DRESSING FRAME STYLE 1627: Brand: 3M™ TEGADERM™

## (undated) DEVICE — SUT MNCRYL PLS ANTIB UD 4/0 PS2 18IN

## (undated) DEVICE — SUT VIC 3/0 CTI 36IN J944H